# Patient Record
Sex: MALE
[De-identification: names, ages, dates, MRNs, and addresses within clinical notes are randomized per-mention and may not be internally consistent; named-entity substitution may affect disease eponyms.]

---

## 2023-09-18 ENCOUNTER — APPOINTMENT (OUTPATIENT)
Dept: INTERNAL MEDICINE | Facility: CLINIC | Age: 38
End: 2023-09-18
Payer: COMMERCIAL

## 2023-09-18 VITALS
OXYGEN SATURATION: 97 % | WEIGHT: 185 LBS | DIASTOLIC BLOOD PRESSURE: 80 MMHG | HEART RATE: 73 BPM | SYSTOLIC BLOOD PRESSURE: 110 MMHG | HEIGHT: 70 IN | BODY MASS INDEX: 26.48 KG/M2

## 2023-09-18 PROBLEM — Z00.00 ENCOUNTER FOR PREVENTIVE HEALTH EXAMINATION: Status: ACTIVE | Noted: 2023-09-18

## 2023-09-18 PROCEDURE — 99386 PREV VISIT NEW AGE 40-64: CPT | Mod: 25

## 2023-09-18 PROCEDURE — 93000 ELECTROCARDIOGRAM COMPLETE: CPT

## 2023-09-19 LAB
ALBUMIN SERPL ELPH-MCNC: 4.6 G/DL
ALP BLD-CCNC: 52 U/L
ALT SERPL-CCNC: 32 U/L
ANION GAP SERPL CALC-SCNC: 11 MMOL/L
AST SERPL-CCNC: 22 U/L
BILIRUB DIRECT SERPL-MCNC: 0.3 MG/DL
BILIRUB INDIRECT SERPL-MCNC: 1.2 MG/DL
BILIRUB SERPL-MCNC: 1.5 MG/DL
BUN SERPL-MCNC: 13 MG/DL
CALCIUM SERPL-MCNC: 9.4 MG/DL
CHLORIDE SERPL-SCNC: 102 MMOL/L
CHOLEST SERPL-MCNC: 205 MG/DL
CO2 SERPL-SCNC: 25 MMOL/L
CREAT SERPL-MCNC: 1.18 MG/DL
EGFR: 80 ML/MIN/1.73M2
ESTIMATED AVERAGE GLUCOSE: 103 MG/DL
GLUCOSE SERPL-MCNC: 83 MG/DL
HBA1C MFR BLD HPLC: 5.2 %
HCT VFR BLD CALC: 47.7 %
HDLC SERPL-MCNC: 30 MG/DL
HGB BLD-MCNC: 15.9 G/DL
LDLC SERPL CALC-MCNC: 160 MG/DL
MCHC RBC-ENTMCNC: 30.3 PG
MCHC RBC-ENTMCNC: 33.3 GM/DL
MCV RBC AUTO: 90.9 FL
NONHDLC SERPL-MCNC: 175 MG/DL
PLATELET # BLD AUTO: 160 K/UL
POTASSIUM SERPL-SCNC: 4.6 MMOL/L
PROT SERPL-MCNC: 7 G/DL
RBC # BLD: 5.25 M/UL
RBC # FLD: 12.2 %
SODIUM SERPL-SCNC: 138 MMOL/L
TRIGL SERPL-MCNC: 80 MG/DL
TSH SERPL-ACNC: 1.93 UIU/ML
WBC # FLD AUTO: 3.74 K/UL

## 2023-09-20 RX ORDER — HYDROXYZINE HYDROCHLORIDE 25 MG/1
25 TABLET ORAL
Qty: 30 | Refills: 0 | Status: ACTIVE | COMMUNITY
Start: 2023-09-20 | End: 1900-01-01

## 2023-09-21 LAB
TESTOST FREE SERPL-MCNC: 13.3 PG/ML
TESTOST SERPL-MCNC: 931 NG/DL

## 2023-09-25 LAB — ESTROGEN SERPL-MCNC: 191 PG/ML

## 2024-03-06 ENCOUNTER — TRANSCRIPTION ENCOUNTER (OUTPATIENT)
Age: 39
End: 2024-03-06

## 2024-03-08 ENCOUNTER — APPOINTMENT (OUTPATIENT)
Dept: INTERNAL MEDICINE | Facility: CLINIC | Age: 39
End: 2024-03-08
Payer: COMMERCIAL

## 2024-03-08 VITALS
OXYGEN SATURATION: 98 % | TEMPERATURE: 98.2 F | BODY MASS INDEX: 27.2 KG/M2 | SYSTOLIC BLOOD PRESSURE: 100 MMHG | DIASTOLIC BLOOD PRESSURE: 70 MMHG | WEIGHT: 190 LBS | HEIGHT: 70 IN | HEART RATE: 76 BPM

## 2024-03-08 DIAGNOSIS — E78.5 HYPERLIPIDEMIA, UNSPECIFIED: ICD-10-CM

## 2024-03-08 DIAGNOSIS — Z11.3 ENCOUNTER FOR SCREENING FOR INFECTIONS WITH A PREDOMINANTLY SEXUAL MODE OF TRANSMISSION: ICD-10-CM

## 2024-03-08 DIAGNOSIS — J31.0 CHRONIC RHINITIS: ICD-10-CM

## 2024-03-08 DIAGNOSIS — R53.83 OTHER FATIGUE: ICD-10-CM

## 2024-03-08 DIAGNOSIS — B35.9 DERMATOPHYTOSIS, UNSPECIFIED: ICD-10-CM

## 2024-03-08 PROCEDURE — 99214 OFFICE O/P EST MOD 30 MIN: CPT

## 2024-03-08 PROCEDURE — G2211 COMPLEX E/M VISIT ADD ON: CPT

## 2024-03-08 NOTE — REVIEW OF SYSTEMS
[Chills] : chills [Fever] : no fever [Fatigue] : fatigue [Discharge] : no discharge [Chest Pain] : no chest pain [Earache] : no earache [Nasal Discharge] : nasal discharge [Orthopena] : no orthopnea [Shortness Of Breath] : no shortness of breath [Diarrhea] : diarrhea [Abdominal Pain] : no abdominal pain [Dysuria] : no dysuria [Vomiting] : no vomiting [Joint Pain] : no joint pain [Back Pain] : no back pain [Hematuria] : no hematuria [Skin Rash] : no skin rash [Itching] : no itching [Headache] : no headache [Suicidal] : not suicidal [Memory Loss] : no memory loss [Easy Bleeding] : no easy bleeding

## 2024-03-08 NOTE — PLAN
[FreeTextEntry1] : Chronic diarrhea - refer to GI Rhinitis - start flonase Screening for STI - will obtain labs, pt is low risk Ringworm - start clotrimazole HLD - c/w lipitor 20, will obtain labs today HMT - UTD

## 2024-03-08 NOTE — PHYSICAL EXAM
[Normal Sclera/Conjunctiva] : normal sclera/conjunctiva [No Acute Distress] : no acute distress [No JVD] : no jugular venous distention [Normal Outer Ear/Nose] : the outer ears and nose were normal in appearance [No Accessory Muscle Use] : no accessory muscle use [No Respiratory Distress] : no respiratory distress  [Regular Rhythm] : with a regular rhythm [Clear to Auscultation] : lungs were clear to auscultation bilaterally [Normal Rate] : normal rate  [Normal S1, S2] : normal S1 and S2 [No Edema] : there was no peripheral edema [Soft] : abdomen soft [No CVA Tenderness] : no CVA  tenderness [No Joint Swelling] : no joint swelling

## 2024-03-08 NOTE — HISTORY OF PRESENT ILLNESS
[FreeTextEntry1] : follow-up [de-identified] : 38 year old male here for a f/u visit. Currently he complains of chronic diarrhea for over a month.  He has tried to modify his diet and it has not improved.  He denies any blood in stool, weight loss or abdominal pain.  He also complains of nasal congestion for 2 weeks.

## 2024-03-11 LAB
ALBUMIN SERPL ELPH-MCNC: 4.7 G/DL
ALP BLD-CCNC: 99 U/L
ALT SERPL-CCNC: 36 U/L
ANION GAP SERPL CALC-SCNC: 14 MMOL/L
APPEARANCE: CLEAR
AST SERPL-CCNC: 27 U/L
BACTERIA: NEGATIVE /HPF
BILIRUB DIRECT SERPL-MCNC: 0.4 MG/DL
BILIRUB INDIRECT SERPL-MCNC: 2.1 MG/DL
BILIRUB SERPL-MCNC: 2.5 MG/DL
BILIRUBIN URINE: NEGATIVE
BLOOD URINE: NEGATIVE
BUN SERPL-MCNC: 14 MG/DL
CALCIUM SERPL-MCNC: 9.6 MG/DL
CAST: 0 /LPF
CHLORIDE SERPL-SCNC: 100 MMOL/L
CHOLEST SERPL-MCNC: 192 MG/DL
CO2 SERPL-SCNC: 23 MMOL/L
COLOR: NORMAL
CREAT SERPL-MCNC: 1.32 MG/DL
EGFR: 70 ML/MIN/1.73M2
EPITHELIAL CELLS: 0 /HPF
ESTIMATED AVERAGE GLUCOSE: 94 MG/DL
FERRITIN SERPL-MCNC: 47 NG/ML
GLUCOSE QUALITATIVE U: NEGATIVE MG/DL
GLUCOSE SERPL-MCNC: 87 MG/DL
HBA1C MFR BLD HPLC: 4.9 %
HCT VFR BLD CALC: 49.6 %
HDLC SERPL-MCNC: 26 MG/DL
HGB BLD-MCNC: 16.1 G/DL
HIV1+2 AB SPEC QL IA.RAPID: NONREACTIVE
IRON SATN MFR SERPL: 26 %
IRON SERPL-MCNC: 114 UG/DL
KETONES URINE: NEGATIVE MG/DL
LDLC SERPL CALC-MCNC: 144 MG/DL
LEUKOCYTE ESTERASE URINE: NEGATIVE
MCHC RBC-ENTMCNC: 29.3 PG
MCHC RBC-ENTMCNC: 32.5 GM/DL
MCV RBC AUTO: 90.3 FL
MICROSCOPIC-UA: NORMAL
NITRITE URINE: NEGATIVE
NONHDLC SERPL-MCNC: 167 MG/DL
PH URINE: 5.5
PLATELET # BLD AUTO: 217 K/UL
POTASSIUM SERPL-SCNC: 4.6 MMOL/L
PROT SERPL-MCNC: 7.5 G/DL
PROTEIN URINE: NORMAL MG/DL
RBC # BLD: 5.49 M/UL
RBC # FLD: 13.1 %
RED BLOOD CELLS URINE: 2 /HPF
SODIUM SERPL-SCNC: 137 MMOL/L
SPECIFIC GRAVITY URINE: >1.03
T PALLIDUM AB SER QL IA: NEGATIVE
TIBC SERPL-MCNC: 448 UG/DL
TRIGL SERPL-MCNC: 123 MG/DL
TSH SERPL-ACNC: 2.4 UIU/ML
UIBC SERPL-MCNC: 333 UG/DL
UROBILINOGEN URINE: 0.2 MG/DL
VIT B12 SERPL-MCNC: 726 PG/ML
WBC # FLD AUTO: 4.84 K/UL
WHITE BLOOD CELLS URINE: 0 /HPF

## 2024-03-19 ENCOUNTER — APPOINTMENT (OUTPATIENT)
Dept: GASTROENTEROLOGY | Facility: CLINIC | Age: 39
End: 2024-03-19
Payer: COMMERCIAL

## 2024-03-19 VITALS
HEIGHT: 70 IN | RESPIRATION RATE: 14 BRPM | BODY MASS INDEX: 28.06 KG/M2 | WEIGHT: 196 LBS | HEART RATE: 79 BPM | TEMPERATURE: 98 F | SYSTOLIC BLOOD PRESSURE: 136 MMHG | OXYGEN SATURATION: 99 % | DIASTOLIC BLOOD PRESSURE: 80 MMHG

## 2024-03-19 DIAGNOSIS — K21.9 GASTRO-ESOPHAGEAL REFLUX DISEASE W/OUT ESOPHAGITIS: ICD-10-CM

## 2024-03-19 DIAGNOSIS — R15.2 FECAL URGENCY: ICD-10-CM

## 2024-03-19 DIAGNOSIS — Z86.39 PERSONAL HISTORY OF OTHER ENDOCRINE, NUTRITIONAL AND METABOLIC DISEASE: ICD-10-CM

## 2024-03-19 DIAGNOSIS — E80.4 GILBERT SYNDROME: ICD-10-CM

## 2024-03-19 DIAGNOSIS — Z78.9 OTHER SPECIFIED HEALTH STATUS: ICD-10-CM

## 2024-03-19 DIAGNOSIS — K52.9 NONINFECTIVE GASTROENTERITIS AND COLITIS, UNSPECIFIED: ICD-10-CM

## 2024-03-19 PROCEDURE — 99204 OFFICE O/P NEW MOD 45 MIN: CPT

## 2024-03-19 RX ORDER — SODIUM SULFATE, POTASSIUM SULFATE AND MAGNESIUM SULFATE 1.6; 3.13; 17.5 G/177ML; G/177ML; G/177ML
17.5-3.13-1.6 SOLUTION ORAL TWICE DAILY
Qty: 2 | Refills: 0 | Status: ACTIVE | COMMUNITY
Start: 2024-03-19 | End: 1900-01-01

## 2024-03-19 NOTE — REVIEW OF SYSTEMS
[Diarrhea] : diarrhea [Heartburn] : heartburn [Negative] : Heme/Lymph [FreeTextEntry7] : fecal urgency

## 2024-03-19 NOTE — ASSESSMENT
[FreeTextEntry1] : BIB PORTILLO was advised to undergo colonoscopy to which he agreed. The procedure will be performed in [facility] with the assistance of an anesthesiologist. The patient was given a Suprep preparation prescription and understood the  procedure as it was explained to his. He was given a booklet distributed by the [employer] Society of G[employer]  End[employer] explaining the procedure in detail and he understood the risks of the procedure not limited to infection, bleeding, perforation or non- diagnosis of colorectal cancer. He was advised that he could not drive home, if he chooses to  receive sedation.  Further diagnostic and treatment recommendations will be based upon the procedure and any biopsies, if they are taken.  Thank you for allowing me to participate in this patients health care.  , Best personal regards -- Douglas PORTILLO was advised to undergo endoscopy to which he agreed. The procedure will be performed in [facility] with the assistance of an anesthesiologist. He was given a booklet distributed by the American Society of Gastrointestinal Endoscopy explaining the procedure in detail and he understood the risks of the procedure not limited to infection, bleeding, perforation or non- diagnosis of gastric or esophageal cancer.  He was advised that he could not drive home, if he chooses to receive sedation. Further diagnostic and treatment recommendations will be based upon the procedure and any biopsies, if they are taken. Thank you for allowing me to participate in this patients health care.   I spent 47 minutes with the patient and his wife.

## 2024-03-19 NOTE — HISTORY OF PRESENT ILLNESS
\"Final forms have been reviewed/signed by the provider and mailed to the patients home address as there is no authorization form for these forms. Patient notified on 10/27/22.\"   [FreeTextEntry1] : He is a 38-year-old male with a 2-month history of fecal urgency , diarrhea and heartburn.  He denies rectal bleeding or weight loss.  He has a family history of ulcerative colitis specifically his father.  He presently has heartburn on a daily basis and takes Tums daily.  He had an endoscopy and a colonoscopy 13 years ago which were negative.  He denies NSAID use.  He denies difficulty swallowing food.  His routine laboratory data revealed  an elevated total bilirubin with the majority of it being indirect with normal AST ALT and alkaline phosphatase consistent with Gilbert syndrome.   His wife was in the room

## 2024-03-19 NOTE — CONSULT LETTER
[Dear  ___] : Dear  [unfilled], [Consult Letter:] : I had the pleasure of evaluating your patient, [unfilled]. [( Thank you for referring [unfilled] for consultation for _____ )] : Thank you for referring [unfilled] for consultation for [unfilled] [Please see my note below.] : Please see my note below. [Sincerely,] : Sincerely, [Consult Closing:] : Thank you very much for allowing me to participate in the care of this patient.  If you have any questions, please do not hesitate to contact me. [FreeTextEntry3] : Douglas Mitchell MD  Gastroenterology [facility]

## 2024-03-22 RX ORDER — POLYETHYLENE GLYCOL 3350 AND ELECTROLYTES WITH LEMON FLAVOR 236; 22.74; 6.74; 5.86; 2.97 G/4L; G/4L; G/4L; G/4L; G/4L
236 POWDER, FOR SOLUTION ORAL
Qty: 1 | Refills: 0 | Status: ACTIVE | COMMUNITY
Start: 2024-03-22 | End: 1900-01-01

## 2024-04-01 ENCOUNTER — APPOINTMENT (OUTPATIENT)
Dept: GASTROENTEROLOGY | Facility: AMBULATORY SURGERY CENTER | Age: 39
End: 2024-04-01
Payer: COMMERCIAL

## 2024-04-01 ENCOUNTER — RESULT REVIEW (OUTPATIENT)
Age: 39
End: 2024-04-01

## 2024-04-01 DIAGNOSIS — K25.3 ACUTE GASTRIC ULCER W/OUT HEMORRHAGE OR PERFORATION: ICD-10-CM

## 2024-04-01 PROCEDURE — 45380 COLONOSCOPY AND BIOPSY: CPT

## 2024-04-01 PROCEDURE — 43239 EGD BIOPSY SINGLE/MULTIPLE: CPT

## 2024-04-01 RX ORDER — PANTOPRAZOLE 40 MG/1
40 TABLET, DELAYED RELEASE ORAL
Qty: 30 | Refills: 4 | Status: ACTIVE | COMMUNITY
Start: 2024-04-01 | End: 1900-01-01

## 2024-04-01 RX ORDER — DICYCLOMINE HYDROCHLORIDE 20 MG/1
20 TABLET ORAL
Qty: 90 | Refills: 3 | Status: ACTIVE | COMMUNITY
Start: 2024-04-01 | End: 1900-01-01

## 2024-04-04 LAB
C TRACH RRNA SPEC QL NAA+PROBE: NOT DETECTED
N GONORRHOEA RRNA SPEC QL NAA+PROBE: NOT DETECTED
N GONORRHOEA RRNA SPEC QL NAA+PROBE: NOT DETECTED
SOURCE AMPLIFICATION: NORMAL
SOURCE AMPLIFICATION: NORMAL

## 2024-04-04 RX ORDER — FLUTICASONE PROPIONATE 50 UG/1
50 SPRAY, METERED NASAL DAILY
Qty: 1 | Refills: 1 | Status: ACTIVE | COMMUNITY
Start: 2024-03-08 | End: 1900-01-01

## 2024-04-04 RX ORDER — CLOTRIMAZOLE 10 MG/G
1 CREAM TOPICAL TWICE DAILY
Qty: 1 | Refills: 1 | Status: ACTIVE | COMMUNITY
Start: 2024-03-08 | End: 1900-01-01

## 2024-05-10 RX ORDER — ATORVASTATIN CALCIUM 20 MG/1
20 TABLET, FILM COATED ORAL
Qty: 30 | Refills: 2 | Status: ACTIVE | COMMUNITY
Start: 2023-09-19 | End: 1900-01-01

## 2024-05-11 ENCOUNTER — RX RENEWAL (OUTPATIENT)
Age: 39
End: 2024-05-11

## 2024-07-29 ENCOUNTER — APPOINTMENT (OUTPATIENT)
Dept: INTERNAL MEDICINE | Facility: CLINIC | Age: 39
End: 2024-07-29
Payer: COMMERCIAL

## 2024-07-29 VITALS
BODY MASS INDEX: 27.63 KG/M2 | SYSTOLIC BLOOD PRESSURE: 110 MMHG | HEIGHT: 70 IN | TEMPERATURE: 98.4 F | OXYGEN SATURATION: 99 % | DIASTOLIC BLOOD PRESSURE: 80 MMHG | WEIGHT: 193 LBS | HEART RATE: 60 BPM

## 2024-07-29 DIAGNOSIS — Z13.6 ENCOUNTER FOR SCREENING FOR CARDIOVASCULAR DISORDERS: ICD-10-CM

## 2024-07-29 DIAGNOSIS — E78.5 HYPERLIPIDEMIA, UNSPECIFIED: ICD-10-CM

## 2024-07-29 DIAGNOSIS — R53.83 OTHER FATIGUE: ICD-10-CM

## 2024-07-29 DIAGNOSIS — K21.9 GASTRO-ESOPHAGEAL REFLUX DISEASE W/OUT ESOPHAGITIS: ICD-10-CM

## 2024-07-29 PROCEDURE — G2211 COMPLEX E/M VISIT ADD ON: CPT | Mod: NC

## 2024-07-29 PROCEDURE — 99214 OFFICE O/P EST MOD 30 MIN: CPT

## 2024-07-30 LAB
ALBUMIN SERPL ELPH-MCNC: 4.8 G/DL
ALP BLD-CCNC: 83 U/L
ALT SERPL-CCNC: 85 U/L
ANION GAP SERPL CALC-SCNC: 12 MMOL/L
APPEARANCE: CLEAR
AST SERPL-CCNC: 45 U/L
BACTERIA: NEGATIVE /HPF
BASOPHILS # BLD AUTO: 0.05 K/UL
BASOPHILS NFR BLD AUTO: 1.1 %
BILIRUB DIRECT SERPL-MCNC: 0.3 MG/DL
BILIRUB INDIRECT SERPL-MCNC: 2 MG/DL
BILIRUB SERPL-MCNC: 2.3 MG/DL
BILIRUBIN URINE: NEGATIVE
BLOOD URINE: NEGATIVE
BUN SERPL-MCNC: 16 MG/DL
CALCIUM SERPL-MCNC: 10 MG/DL
CAST: 0 /LPF
CHLORIDE SERPL-SCNC: 100 MMOL/L
CHOLEST SERPL-MCNC: 237 MG/DL
CO2 SERPL-SCNC: 26 MMOL/L
COLOR: YELLOW
CREAT SERPL-MCNC: 1.18 MG/DL
EGFR: 80 ML/MIN/1.73M2
EOSINOPHIL # BLD AUTO: 0.1 K/UL
EOSINOPHIL NFR BLD AUTO: 2.1 %
EPITHELIAL CELLS: 0 /HPF
ESTIMATED AVERAGE GLUCOSE: 91 MG/DL
ESTRADIOL SERPL-MCNC: 38 PG/ML
GLUCOSE QUALITATIVE U: NEGATIVE MG/DL
GLUCOSE SERPL-MCNC: 89 MG/DL
HBA1C MFR BLD HPLC: 4.8 %
HCT VFR BLD CALC: 45.5 %
HDLC SERPL-MCNC: 32 MG/DL
HGB BLD-MCNC: 14.5 G/DL
IMM GRANULOCYTES NFR BLD AUTO: 0.2 %
KETONES URINE: NEGATIVE MG/DL
LDLC SERPL CALC-MCNC: 190 MG/DL
LEUKOCYTE ESTERASE URINE: NEGATIVE
LYMPHOCYTES # BLD AUTO: 1.24 K/UL
LYMPHOCYTES NFR BLD AUTO: 26.3 %
MAN DIFF?: NORMAL
MCHC RBC-ENTMCNC: 28.3 PG
MCHC RBC-ENTMCNC: 31.9 GM/DL
MCV RBC AUTO: 88.9 FL
MICROSCOPIC-UA: NORMAL
MONOCYTES # BLD AUTO: 0.49 K/UL
MONOCYTES NFR BLD AUTO: 10.4 %
NEUTROPHILS # BLD AUTO: 2.83 K/UL
NEUTROPHILS NFR BLD AUTO: 59.9 %
NITRITE URINE: NEGATIVE
NONHDLC SERPL-MCNC: 204 MG/DL
PH URINE: 7
PLATELET # BLD AUTO: 202 K/UL
POTASSIUM SERPL-SCNC: 4.5 MMOL/L
PROLACTIN SERPL-MCNC: 14.5 NG/ML
PROT SERPL-MCNC: 7.4 G/DL
PROTEIN URINE: NEGATIVE MG/DL
RBC # BLD: 5.12 M/UL
RBC # FLD: 14.3 %
RED BLOOD CELLS URINE: 0 /HPF
SHBG SERPL-SCNC: 8.6 NMOL/L
SODIUM SERPL-SCNC: 138 MMOL/L
SPECIFIC GRAVITY URINE: 1.01
TRIGL SERPL-MCNC: 81 MG/DL
TSH SERPL-ACNC: 1.79 UIU/ML
UROBILINOGEN URINE: 0.2 MG/DL
WBC # FLD AUTO: 4.72 K/UL
WHITE BLOOD CELLS URINE: 0 /HPF

## 2024-07-30 NOTE — PLAN
[FreeTextEntry1] : HLD - c/w statin, check labs, refer for CT heart calcium score Chronic diarrhea - f/u w/ GI HMT - UTD

## 2024-07-30 NOTE — REVIEW OF SYSTEMS
[Constipation] : constipation [Diarrhea] : diarrhea [Fever] : no fever [Night Sweats] : no night sweats [Discharge] : no discharge [Vision Problems] : no vision problems [Earache] : no earache [Nasal Discharge] : no nasal discharge [Chest Pain] : no chest pain [Orthopena] : no orthopnea [Shortness Of Breath] : no shortness of breath [Abdominal Pain] : no abdominal pain [Vomiting] : no vomiting [Dysuria] : no dysuria [Hematuria] : no hematuria [Joint Pain] : no joint pain [Back Pain] : no back pain [Itching] : no itching [Skin Rash] : no skin rash

## 2024-07-30 NOTE — HISTORY OF PRESENT ILLNESS
[FreeTextEntry1] : follow-up, HLD [de-identified] : 39 year old male here for a f/u visit. Currently he has no acute medical complaints.

## 2024-07-31 LAB
TESTOST FREE SERPL-MCNC: 12.7 PG/ML
TESTOST SERPL-MCNC: 463 NG/DL

## 2024-10-08 ENCOUNTER — INPATIENT (INPATIENT)
Facility: HOSPITAL | Age: 39
LOS: 2 days | Discharge: HOME | DRG: 558 | End: 2024-10-11
Attending: INTERNAL MEDICINE | Admitting: INTERNAL MEDICINE
Payer: COMMERCIAL

## 2024-10-08 VITALS
WEIGHT: 186.07 LBS | HEART RATE: 86 BPM | HEIGHT: 70 IN | SYSTOLIC BLOOD PRESSURE: 120 MMHG | OXYGEN SATURATION: 98 % | DIASTOLIC BLOOD PRESSURE: 81 MMHG | RESPIRATION RATE: 18 BRPM | TEMPERATURE: 98 F

## 2024-10-08 DIAGNOSIS — Z29.9 ENCOUNTER FOR PROPHYLACTIC MEASURES, UNSPECIFIED: ICD-10-CM

## 2024-10-08 DIAGNOSIS — R74.01 ELEVATION OF LEVELS OF LIVER TRANSAMINASE LEVELS: ICD-10-CM

## 2024-10-08 DIAGNOSIS — R17 UNSPECIFIED JAUNDICE: ICD-10-CM

## 2024-10-08 LAB
ALBUMIN SERPL ELPH-MCNC: 4.2 G/DL — SIGNIFICANT CHANGE UP (ref 3.3–5)
ALP SERPL-CCNC: 67 U/L — SIGNIFICANT CHANGE UP (ref 40–120)
ALT FLD-CCNC: 196 U/L — HIGH (ref 10–45)
ANION GAP SERPL CALC-SCNC: 10 MMOL/L — SIGNIFICANT CHANGE UP (ref 5–17)
APPEARANCE UR: CLEAR — SIGNIFICANT CHANGE UP
AST SERPL-CCNC: 773 U/L — HIGH (ref 10–40)
BACTERIA # UR AUTO: NEGATIVE /HPF — SIGNIFICANT CHANGE UP
BASOPHILS # BLD AUTO: 0.03 K/UL — SIGNIFICANT CHANGE UP (ref 0–0.2)
BASOPHILS NFR BLD AUTO: 0.4 % — SIGNIFICANT CHANGE UP (ref 0–2)
BILIRUB SERPL-MCNC: 2.3 MG/DL — HIGH (ref 0.2–1.2)
BILIRUB UR-MCNC: NEGATIVE — SIGNIFICANT CHANGE UP
BUN SERPL-MCNC: 15 MG/DL — SIGNIFICANT CHANGE UP (ref 7–23)
CALCIUM SERPL-MCNC: 9 MG/DL — SIGNIFICANT CHANGE UP (ref 8.4–10.5)
CAST: 2 /LPF — SIGNIFICANT CHANGE UP (ref 0–4)
CHLORIDE SERPL-SCNC: 102 MMOL/L — SIGNIFICANT CHANGE UP (ref 96–108)
CK SERPL-CCNC: CRITICAL HIGH U/L (ref 30–200)
CO2 SERPL-SCNC: 23 MMOL/L — SIGNIFICANT CHANGE UP (ref 22–31)
COLOR SPEC: ABNORMAL
CREAT SERPL-MCNC: 1.21 MG/DL — SIGNIFICANT CHANGE UP (ref 0.5–1.3)
DIFF PNL FLD: ABNORMAL
EGFR: 77 ML/MIN/1.73M2 — SIGNIFICANT CHANGE UP
EOSINOPHIL # BLD AUTO: 0.14 K/UL — SIGNIFICANT CHANGE UP (ref 0–0.5)
EOSINOPHIL NFR BLD AUTO: 1.9 % — SIGNIFICANT CHANGE UP (ref 0–6)
GLUCOSE SERPL-MCNC: 109 MG/DL — HIGH (ref 70–99)
GLUCOSE UR QL: NEGATIVE MG/DL — SIGNIFICANT CHANGE UP
HCT VFR BLD CALC: 54.8 % — HIGH (ref 39–50)
HGB BLD-MCNC: 17.4 G/DL — HIGH (ref 13–17)
IMM GRANULOCYTES NFR BLD AUTO: 0.4 % — SIGNIFICANT CHANGE UP (ref 0–0.9)
KETONES UR-MCNC: NEGATIVE MG/DL — SIGNIFICANT CHANGE UP
LEUKOCYTE ESTERASE UR-ACNC: ABNORMAL
LYMPHOCYTES # BLD AUTO: 0.8 K/UL — LOW (ref 1–3.3)
LYMPHOCYTES # BLD AUTO: 10.8 % — LOW (ref 13–44)
MAGNESIUM SERPL-MCNC: 2.6 MG/DL — SIGNIFICANT CHANGE UP (ref 1.6–2.6)
MCHC RBC-ENTMCNC: 27.6 PG — SIGNIFICANT CHANGE UP (ref 27–34)
MCHC RBC-ENTMCNC: 31.8 GM/DL — LOW (ref 32–36)
MCV RBC AUTO: 86.8 FL — SIGNIFICANT CHANGE UP (ref 80–100)
MONOCYTES # BLD AUTO: 0.45 K/UL — SIGNIFICANT CHANGE UP (ref 0–0.9)
MONOCYTES NFR BLD AUTO: 6.1 % — SIGNIFICANT CHANGE UP (ref 2–14)
NEUTROPHILS # BLD AUTO: 5.93 K/UL — SIGNIFICANT CHANGE UP (ref 1.8–7.4)
NEUTROPHILS NFR BLD AUTO: 80.4 % — HIGH (ref 43–77)
NITRITE UR-MCNC: NEGATIVE — SIGNIFICANT CHANGE UP
NRBC # BLD: 0 /100 WBCS — SIGNIFICANT CHANGE UP (ref 0–0)
PH UR: 5.5 — SIGNIFICANT CHANGE UP (ref 5–8)
PLATELET # BLD AUTO: 170 K/UL — SIGNIFICANT CHANGE UP (ref 150–400)
POTASSIUM SERPL-MCNC: 4.5 MMOL/L — SIGNIFICANT CHANGE UP (ref 3.5–5.3)
POTASSIUM SERPL-SCNC: 4.5 MMOL/L — SIGNIFICANT CHANGE UP (ref 3.5–5.3)
PROT SERPL-MCNC: 7.2 G/DL — SIGNIFICANT CHANGE UP (ref 6–8.3)
PROT UR-MCNC: 300 MG/DL
RBC # BLD: 6.31 M/UL — HIGH (ref 4.2–5.8)
RBC # FLD: 13.7 % — SIGNIFICANT CHANGE UP (ref 10.3–14.5)
RBC CASTS # UR COMP ASSIST: 0 /HPF — SIGNIFICANT CHANGE UP (ref 0–4)
REVIEW: SIGNIFICANT CHANGE UP
SODIUM SERPL-SCNC: 135 MMOL/L — SIGNIFICANT CHANGE UP (ref 135–145)
SP GR SPEC: 1.02 — SIGNIFICANT CHANGE UP (ref 1–1.03)
SQUAMOUS # UR AUTO: 2 /HPF — SIGNIFICANT CHANGE UP (ref 0–5)
UROBILINOGEN FLD QL: 0.2 MG/DL — SIGNIFICANT CHANGE UP (ref 0.2–1)
WBC # BLD: 7.38 K/UL — SIGNIFICANT CHANGE UP (ref 3.8–10.5)
WBC # FLD AUTO: 7.38 K/UL — SIGNIFICANT CHANGE UP (ref 3.8–10.5)
WBC UR QL: 0 /HPF — SIGNIFICANT CHANGE UP (ref 0–5)

## 2024-10-08 PROCEDURE — 99223 1ST HOSP IP/OBS HIGH 75: CPT

## 2024-10-08 PROCEDURE — 99285 EMERGENCY DEPT VISIT HI MDM: CPT

## 2024-10-08 RX ORDER — MAG HYDROX/ALUMINUM HYD/SIMETH 200-200-20
30 SUSPENSION, ORAL (FINAL DOSE FORM) ORAL EVERY 4 HOURS
Refills: 0 | Status: DISCONTINUED | OUTPATIENT
Start: 2024-10-08 | End: 2024-10-11

## 2024-10-08 RX ORDER — SODIUM CHLORIDE IRRIG SOLUTION 0.9 %
1000 SOLUTION, IRRIGATION IRRIGATION
Refills: 0 | Status: DISCONTINUED | OUTPATIENT
Start: 2024-10-08 | End: 2024-10-08

## 2024-10-08 RX ORDER — ACETAMINOPHEN 325 MG
1000 TABLET ORAL ONCE
Refills: 0 | Status: COMPLETED | OUTPATIENT
Start: 2024-10-08 | End: 2024-10-08

## 2024-10-08 RX ORDER — 5-HYDROXYTRYPTOPHAN (5-HTP) 100 MG
3 TABLET,DISINTEGRATING ORAL AT BEDTIME
Refills: 0 | Status: DISCONTINUED | OUTPATIENT
Start: 2024-10-08 | End: 2024-10-11

## 2024-10-08 RX ORDER — ACETAMINOPHEN 325 MG
650 TABLET ORAL EVERY 6 HOURS
Refills: 0 | Status: DISCONTINUED | OUTPATIENT
Start: 2024-10-08 | End: 2024-10-11

## 2024-10-08 RX ORDER — SODIUM CHLORIDE IRRIG SOLUTION 0.9 %
1000 SOLUTION, IRRIGATION IRRIGATION ONCE
Refills: 0 | Status: COMPLETED | OUTPATIENT
Start: 2024-10-08 | End: 2024-10-08

## 2024-10-08 RX ORDER — INFLUENZA VIRUS VACCINE 15; 15; 15; 15 UG/.5ML; UG/.5ML; UG/.5ML; UG/.5ML
0.5 SUSPENSION INTRAMUSCULAR ONCE
Refills: 0 | Status: DISCONTINUED | OUTPATIENT
Start: 2024-10-08 | End: 2024-10-11

## 2024-10-08 RX ORDER — SODIUM CHLORIDE IRRIG SOLUTION 0.9 %
1000 SOLUTION, IRRIGATION IRRIGATION
Refills: 0 | Status: COMPLETED | OUTPATIENT
Start: 2024-10-08 | End: 2024-10-09

## 2024-10-08 RX ORDER — ONDANSETRON HCL/PF 4 MG/2 ML
4 VIAL (ML) INJECTION EVERY 8 HOURS
Refills: 0 | Status: DISCONTINUED | OUTPATIENT
Start: 2024-10-08 | End: 2024-10-11

## 2024-10-08 RX ADMIN — Medication 1000 MILLIGRAM(S): at 11:00

## 2024-10-08 RX ADMIN — Medication 1000 MILLILITER(S): at 07:15

## 2024-10-08 RX ADMIN — Medication 200 MILLILITER(S): at 12:57

## 2024-10-08 RX ADMIN — Medication 200 MILLILITER(S): at 20:51

## 2024-10-08 RX ADMIN — Medication 1000 MILLILITER(S): at 09:20

## 2024-10-08 RX ADMIN — Medication 200 MILLILITER(S): at 11:01

## 2024-10-08 RX ADMIN — Medication 400 MILLIGRAM(S): at 07:51

## 2024-10-08 RX ADMIN — Medication 650 MILLIGRAM(S): at 20:51

## 2024-10-08 RX ADMIN — Medication 650 MILLIGRAM(S): at 12:55

## 2024-10-08 RX ADMIN — Medication 650 MILLIGRAM(S): at 21:21

## 2024-10-08 NOTE — ED PROVIDER NOTE - PROGRESS NOTE DETAILS
Sign out received on patient. Patient re-assessed by me at bedside. Patent endorsing some soreness/pain over triceps. State arms feel sore and difficult to lift to face. Ordered IV Ofirmev. Urine collection pending. Patient currently receiving IV hydration.  Pending labs results.   MD Anna Total Bilirubin and ALT elevations noted. 2nd Liter of Fluids ordered. Pending remainder of labs and urine. Total Bilirubin and ALT elevations noted. 2nd Liter of Fluids ordered. Pending remainder of labs and urine. CK 85,792. Large amounts of Blood in he Urine. Admitting to medicine

## 2024-10-08 NOTE — H&P ADULT - NSHPPHYSICALEXAM_GEN_ALL_CORE
Vital Signs Last 24 Hrs  T(C): 36.6 (08 Oct 2024 06:24), Max: 36.6 (08 Oct 2024 06:24)  T(F): 97.9 (08 Oct 2024 06:24), Max: 97.9 (08 Oct 2024 06:24)  HR: 75 (08 Oct 2024 11:30) (75 - 86)  BP: 129/68 (08 Oct 2024 11:30) (117/72 - 129/68)  BP(mean): --  RR: 20 (08 Oct 2024 11:30) (18 - 20)  SpO2: 100% (08 Oct 2024 11:30) (98% - 100%)    Parameters below as of 08 Oct 2024 11:30  Patient On (Oxygen Delivery Method): room air    PHYSICAL EXAM:  GENERAL: NAD, well-developed  HEAD:  Atraumatic, normocephalic  EYES: EOMI, conjunctiva and sclera clear  NECK: Supple, no JVD  CHEST/LUNG: Clear to auscultation bilaterally; no wheezing or rales  HEART: Regular rate and rhythm; no murmurs, no LE edema   ABDOMEN: Soft, nontender, nondistended; bowel sounds present  EXTREMITIES:  2+ Peripheral Pulses, no clubbing, cyanosis  PSYCH: AAOx3, calm affect, not anxious  NEUROLOGY: non-focal, moving all extremities equally, sensation grossly intact   SKIN: No rashes or lesions  MUSCULOSKELETAL: no back pain, moving all extremities

## 2024-10-08 NOTE — H&P ADULT - PROBLEM SELECTOR PLAN 1
pt with rhabdomyolysis in setting of intense work-out with CK ~86K and c/o upper body pain and swelling   - c/w IVF at 200cc hr for now   - pain control with tylenol   - repeat CK in AM   - Cr at baseline of 1.2 - c/t monitor in setting of rhabdo

## 2024-10-08 NOTE — H&P ADULT - NSHPLABSRESULTS_GEN_ALL_CORE
17.4   7.38  )-----------( 170      ( 08 Oct 2024 07:31 )             54.8     10    135  |  102  |  15  ----------------------------<  109[H]  4.5   |  23  |  1.21    Ca    9.0      08 Oct 2024 07:31  Mg     2.6     10    TPro  7.2  /  Alb  4.2  /  TBili  2.3[H]  /  DBili  x   /  AST  773[H]  /  ALT  196[H]  /  AlkPhos  67  10            Urinalysis Basic - ( 08 Oct 2024 09:29 )    Color: Orange / Appearance: Clear / S.017 / pH: x  Gluc: x / Ketone: Negative mg/dL  / Bili: Negative / Urobili: 0.2 mg/dL   Blood: x / Protein: 300 mg/dL / Nitrite: Negative   Leuk Esterase: Small / RBC: 0 /HPF / WBC 0 /HPF   Sq Epi: x / Non Sq Epi: 2 /HPF / Bacteria: Negative /HPF

## 2024-10-08 NOTE — ED ADULT NURSE REASSESSMENT NOTE - NS ED NURSE REASSESS COMMENT FT1
Received awake alert and orientedx4 Resp even and nonlab LR admin C/o Pain MED aware. Labs sent. SO at bedside.

## 2024-10-08 NOTE — PATIENT PROFILE ADULT - FALL HARM RISK - HARM RISK INTERVENTIONS

## 2024-10-08 NOTE — H&P ADULT - PROBLEM SELECTOR PLAN 4
VTE ppx - low risk, encourage OOB   Diet - regular   Dispo - pending clinical improvement     Med management:  Holding statin inpt - can resume as outpt   resume testosterone after discharge

## 2024-10-08 NOTE — ED ADULT NURSE NOTE - OBJECTIVE STATEMENT
pt is a 40yo male presenting to the ED complaining of dark urine. pt reports onset of b/l arm pain, dark colored urine and penile pain upon waking up this AM, reports working out at gym yesterday and also exerting self at work by lifting heavy boxes, coming to ED for evaluation. pt also reports statin and testosterone use.pt A&Ox3 gross neuro intact, no difficulty speaking in complete sentences, pulses x 4, dick x4, abdomen soft nontender nondistended, skin intact. pt denies chest pain, sob, ha, n/v/d, abdominal pain, f/c

## 2024-10-08 NOTE — H&P ADULT - HISTORY OF PRESENT ILLNESS
39M with PMH of HLD, GERD, [place]' syndrome p/w muscle soreness and dark urine. Pt states he has vrial gastroenteritis symptoms over the weekend with abd pain, nausea and diarrhea, which self resolved after 36 hours. On day PTA, pt went to the gym and worked out with more intensity than recent weeks, specifically his upper body. Later that day at work, he was helping to lift heavy boxes as well. That night he felt soreness in b/l UE and chest wall, and took some tylenol. Overnight and into this morning, the pain became more intense and he noted swelling of b/l UEs. He noted dark urine this AM which prompted him to come to ED for further evaluation.   Endorsing non radiating MSK pain in b/l UEs and b/l lateral chest walls, worse with movement, +swollen, no radiation, no SOB.  Denies any HA, blurry vision, no abd pain, no n/v, +dark urine, no dysuria or blood, last BM yest, normal, no blood, no diarrhea, no LE pain or swelling. Has never had this happen in the past.

## 2024-10-08 NOTE — ED PROVIDER NOTE - PHYSICAL EXAMINATION
Alert awake and oriented, comfortable well-appearing  Mild tenderness to palpation over the bilateral triceps areas no skin changes distal neurovascular exam is reassuring clear lungs S1-S2 soft nontender abdomen no organomegaly no other skin changes noticed the compartments of the lower extremities examined and are soft

## 2024-10-08 NOTE — ED PROVIDER NOTE - CLINICAL SUMMARY MEDICAL DECISION MAKING FREE TEXT BOX
Concern for for rhabdomyolysis   from exertion which could have been exacerbated by testosterone or statins we will do labs UA IV hydration and reassess low concern for compartment syndrome given patient has intact neurologic exam and pulses are normal Concern for for rhabdomyolysis   from exertion which could have been exacerbated by testosterone or statins we will do labs UA IV hydration and reassess low concern for compartment syndrome given patient has intact neurologic exam and pulses are normal    CK 85,792. ALT and AST elevated. Urine + for large amounts of blood.  Admitting to medicine for IV hydration

## 2024-10-08 NOTE — ED PROVIDER NOTE - OBJECTIVE STATEMENT
39-year-old gentleman with history of hyperlipidemia on atorvastatin 40 mg as well as injections of testosterone weekly and 150 units presenting with upper back and triceps bilateral pain and discomfort that was precipitated by working out more heavily than usual as well as moving heavy objects at work right after patient experienced dark urine today and was worried that this might have been due to with his heavy lifting and decided to present denies chest pain shortness of breath abdominal pain nausea vomiting rectal bleeding denies urinary frequency but has discomfort with urination denies any new medications

## 2024-10-08 NOTE — H&P ADULT - NSICDXPASTMEDICALHX_GEN_ALL_CORE_FT
PAST MEDICAL HISTORY:  GERD (gastroesophageal reflux disease)     Gilbert syndrome     HLD (hyperlipidemia)

## 2024-10-08 NOTE — H&P ADULT - ASSESSMENT
39M with PMH of HLD, GERD, [place]' syndrome p/w muscle soreness and dark urine, a/w rhabdomyolysis.

## 2024-10-09 LAB
ALBUMIN SERPL ELPH-MCNC: 3.5 G/DL — SIGNIFICANT CHANGE UP (ref 3.3–5)
ALP SERPL-CCNC: 60 U/L — SIGNIFICANT CHANGE UP (ref 40–120)
ALT FLD-CCNC: 357 U/L — HIGH (ref 10–45)
ANION GAP SERPL CALC-SCNC: 10 MMOL/L — SIGNIFICANT CHANGE UP (ref 5–17)
AST SERPL-CCNC: 1234 U/L — HIGH (ref 10–40)
BILIRUB SERPL-MCNC: 1.2 MG/DL — SIGNIFICANT CHANGE UP (ref 0.2–1.2)
BUN SERPL-MCNC: 13 MG/DL — SIGNIFICANT CHANGE UP (ref 7–23)
CALCIUM SERPL-MCNC: 9 MG/DL — SIGNIFICANT CHANGE UP (ref 8.4–10.5)
CHLORIDE SERPL-SCNC: 105 MMOL/L — SIGNIFICANT CHANGE UP (ref 96–108)
CK SERPL-CCNC: CRITICAL HIGH U/L (ref 30–200)
CO2 SERPL-SCNC: 23 MMOL/L — SIGNIFICANT CHANGE UP (ref 22–31)
CREAT SERPL-MCNC: 0.96 MG/DL — SIGNIFICANT CHANGE UP (ref 0.5–1.3)
EGFR: 102 ML/MIN/1.73M2 — SIGNIFICANT CHANGE UP
GLUCOSE SERPL-MCNC: 119 MG/DL — HIGH (ref 70–99)
HCT VFR BLD CALC: 46.8 % — SIGNIFICANT CHANGE UP (ref 39–50)
HGB BLD-MCNC: 15.2 G/DL — SIGNIFICANT CHANGE UP (ref 13–17)
MAGNESIUM SERPL-MCNC: 2.2 MG/DL — SIGNIFICANT CHANGE UP (ref 1.6–2.6)
MCHC RBC-ENTMCNC: 28.1 PG — SIGNIFICANT CHANGE UP (ref 27–34)
MCHC RBC-ENTMCNC: 32.5 GM/DL — SIGNIFICANT CHANGE UP (ref 32–36)
MCV RBC AUTO: 86.5 FL — SIGNIFICANT CHANGE UP (ref 80–100)
NRBC # BLD: 0 /100 WBCS — SIGNIFICANT CHANGE UP (ref 0–0)
PLATELET # BLD AUTO: 150 K/UL — SIGNIFICANT CHANGE UP (ref 150–400)
POTASSIUM SERPL-MCNC: 3.9 MMOL/L — SIGNIFICANT CHANGE UP (ref 3.5–5.3)
POTASSIUM SERPL-SCNC: 3.9 MMOL/L — SIGNIFICANT CHANGE UP (ref 3.5–5.3)
PROT SERPL-MCNC: 5.9 G/DL — LOW (ref 6–8.3)
RBC # BLD: 5.41 M/UL — SIGNIFICANT CHANGE UP (ref 4.2–5.8)
RBC # FLD: 13.5 % — SIGNIFICANT CHANGE UP (ref 10.3–14.5)
SODIUM SERPL-SCNC: 138 MMOL/L — SIGNIFICANT CHANGE UP (ref 135–145)
WBC # BLD: 4.56 K/UL — SIGNIFICANT CHANGE UP (ref 3.8–10.5)
WBC # FLD AUTO: 4.56 K/UL — SIGNIFICANT CHANGE UP (ref 3.8–10.5)

## 2024-10-09 PROCEDURE — 99232 SBSQ HOSP IP/OBS MODERATE 35: CPT | Mod: GC

## 2024-10-09 PROCEDURE — 76705 ECHO EXAM OF ABDOMEN: CPT | Mod: 26

## 2024-10-09 RX ADMIN — Medication 650 MILLIGRAM(S): at 07:02

## 2024-10-09 RX ADMIN — Medication 200 MILLILITER(S): at 09:27

## 2024-10-09 RX ADMIN — Medication 650 MILLIGRAM(S): at 06:32

## 2024-10-09 RX ADMIN — Medication 200 MILLILITER(S): at 16:37

## 2024-10-09 NOTE — PROGRESS NOTE ADULT - SUBJECTIVE AND OBJECTIVE BOX
Patient is a 39y old  Male who presents with a chief complaint of muscle soreness, dark urine (09 Oct 2024 07:39)      ======Overnight/Subjective======  Overnight there were no events. Patient endorses a decrease in muscle soreness. Denies fevers, chills, night sweats, SOB, cp, nausea or vomiting. He does endorse taking testosterone that he gets at his gym and for which he orders his own labs. Discussed at length the need for transition of care to an endocrinologist.    Brief daily plan: Trend CK    ======Medications======  MEDICATIONS  (STANDING):  influenza   Vaccine 0.5 milliLiter(s) IntraMuscular once    MEDICATIONS  (PRN):  acetaminophen     Tablet .. 650 milliGRAM(s) Oral every 6 hours PRN Temp greater or equal to 38C (100.4F), Mild Pain (1 - 3)  aluminum hydroxide/magnesium hydroxide/simethicone Suspension 30 milliLiter(s) Oral every 4 hours PRN Dyspepsia  melatonin 3 milliGRAM(s) Oral at bedtime PRN Insomnia  ondansetron Injectable 4 milliGRAM(s) IV Push every 8 hours PRN Nausea and/or Vomiting      ======Vital Signs======  T(C): 36.7 (10-09-24 @ 11:35), Max: 36.8 (10-09-24 @ 05:55)  T(F): 98.1 (10-09-24 @ 11:35), Max: 98.2 (10-09-24 @ 05:55)  HR: 58 (10-09-24 @ :35) (58 - 82)  BP: 140/76 (10-09-24 @ 11:35) (137/75 - 140/76)  BP(mean): --  RR: 18 (10-09-24 @ 11:35) (18 - 18)  SpO2: 99% (10-09-24 @ :35) (96% - 99%)    ======Physical exams======  GENERAL: NAD  Cardiovascular: RRR, S1 S2, no m/r/g, no JVD  LUNGS: Unlabored respiration, CTABL  ABDOMEN: Soft, NTND  EXTREMITIES: Warm extremities, no edema, peripheral pulses 2+ bilaterally  NEURO: AAOx3, PERRLA    ======Labs======                15.2  4.56 >----------< 150  (MCV: 86.5)                46.8   138 | 105 | 13  -----------------------< 119[H]  3.9 | 23 | 0.96    TPro: 5.9[L] / Alb: 3.5 / TBili: 1.2 / DBili: -- / AlkPhos: 60 / ALT: 357[H] / AST: 1234[H] (10-09-24 @ 07:50)  Ca: 9.0 / Phos: -- / M.2 (10-09-24 @ 07:50)          ======Microbiology======  Urinalysis Basic - ( 09 Oct 2024 07:50 )    Color: x / Appearance: x / SG: x / pH: x  Gluc: 119 mg/dL / Ketone: x  / Bili: x / Urobili: x   Blood: x / Protein: x / Nitrite: x   Leuk Esterase: x / RBC: x / WBC x   Sq Epi: x / Non Sq Epi: x / Bacteria: x          ======I&O's======  I&O's Summary    09 Oct 2024 07:01  -  09 Oct 2024 17:21  --------------------------------------------------------  IN: 0 mL / OUT: 2100 mL / NET: -2100 mL

## 2024-10-09 NOTE — PROGRESS NOTE ADULT - ASSESSMENT
39M with PMH of HLD, GERD, [place]' syndrome p/w muscle soreness and dark urine, a/w rhabdomyolysis.  39M with PMH of HLD, GERD, [place]' syndrome p/w muscle soreness and dark urine, a/w rhabdomyolysis.

## 2024-10-09 NOTE — PROGRESS NOTE ADULT - PROBLEM SELECTOR PLAN 2
AST/ALT elevated 2/2 rhabdo  c/t monitor with treatment as above    PLAN:  f/u RUQ u/s  f/u gena's work up

## 2024-10-09 NOTE — PROGRESS NOTE ADULT - PROBLEM SELECTOR PLAN 1
pt with rhabdomyolysis in setting of intense work-out with CK ~86K and c/o upper body pain and swelling   - c/w IVF at 200cc hr for now   - pain control with tylenol   - repeat CK daily  - Cr at baseline of 1.2 - c/t monitor in setting of rhabdo

## 2024-10-10 LAB
ALBUMIN SERPL ELPH-MCNC: 4.1 G/DL — SIGNIFICANT CHANGE UP (ref 3.3–5)
ALP SERPL-CCNC: 65 U/L — SIGNIFICANT CHANGE UP (ref 40–120)
ALT FLD-CCNC: 505 U/L — HIGH (ref 10–45)
ANION GAP SERPL CALC-SCNC: 11 MMOL/L — SIGNIFICANT CHANGE UP (ref 5–17)
AST SERPL-CCNC: 1290 U/L — HIGH (ref 10–40)
BASOPHILS # BLD AUTO: 0.04 K/UL — SIGNIFICANT CHANGE UP (ref 0–0.2)
BASOPHILS NFR BLD AUTO: 0.8 % — SIGNIFICANT CHANGE UP (ref 0–2)
BILIRUB SERPL-MCNC: 1 MG/DL — SIGNIFICANT CHANGE UP (ref 0.2–1.2)
BUN SERPL-MCNC: 13 MG/DL — SIGNIFICANT CHANGE UP (ref 7–23)
CALCIUM SERPL-MCNC: 9.9 MG/DL — SIGNIFICANT CHANGE UP (ref 8.4–10.5)
CHLORIDE SERPL-SCNC: 101 MMOL/L — SIGNIFICANT CHANGE UP (ref 96–108)
CK SERPL-CCNC: CRITICAL HIGH U/L (ref 30–200)
CO2 SERPL-SCNC: 24 MMOL/L — SIGNIFICANT CHANGE UP (ref 22–31)
CREAT SERPL-MCNC: 1.08 MG/DL — SIGNIFICANT CHANGE UP (ref 0.5–1.3)
EGFR: 88 ML/MIN/1.73M2 — SIGNIFICANT CHANGE UP
EOSINOPHIL # BLD AUTO: 0.17 K/UL — SIGNIFICANT CHANGE UP (ref 0–0.5)
EOSINOPHIL NFR BLD AUTO: 3.4 % — SIGNIFICANT CHANGE UP (ref 0–6)
GLUCOSE SERPL-MCNC: 101 MG/DL — HIGH (ref 70–99)
HCT VFR BLD CALC: 49.1 % — SIGNIFICANT CHANGE UP (ref 39–50)
HGB BLD-MCNC: 15.7 G/DL — SIGNIFICANT CHANGE UP (ref 13–17)
IMM GRANULOCYTES NFR BLD AUTO: 0.4 % — SIGNIFICANT CHANGE UP (ref 0–0.9)
LYMPHOCYTES # BLD AUTO: 1.27 K/UL — SIGNIFICANT CHANGE UP (ref 1–3.3)
LYMPHOCYTES # BLD AUTO: 25.4 % — SIGNIFICANT CHANGE UP (ref 13–44)
MAGNESIUM SERPL-MCNC: 1.9 MG/DL — SIGNIFICANT CHANGE UP (ref 1.6–2.6)
MCHC RBC-ENTMCNC: 27.5 PG — SIGNIFICANT CHANGE UP (ref 27–34)
MCHC RBC-ENTMCNC: 32 GM/DL — SIGNIFICANT CHANGE UP (ref 32–36)
MCV RBC AUTO: 86 FL — SIGNIFICANT CHANGE UP (ref 80–100)
MONOCYTES # BLD AUTO: 0.38 K/UL — SIGNIFICANT CHANGE UP (ref 0–0.9)
MONOCYTES NFR BLD AUTO: 7.6 % — SIGNIFICANT CHANGE UP (ref 2–14)
NEUTROPHILS # BLD AUTO: 3.12 K/UL — SIGNIFICANT CHANGE UP (ref 1.8–7.4)
NEUTROPHILS NFR BLD AUTO: 62.4 % — SIGNIFICANT CHANGE UP (ref 43–77)
NRBC # BLD: 0 /100 WBCS — SIGNIFICANT CHANGE UP (ref 0–0)
PHOSPHATE SERPL-MCNC: 4.2 MG/DL — SIGNIFICANT CHANGE UP (ref 2.5–4.5)
PLATELET # BLD AUTO: 167 K/UL — SIGNIFICANT CHANGE UP (ref 150–400)
POTASSIUM SERPL-MCNC: 4.1 MMOL/L — SIGNIFICANT CHANGE UP (ref 3.5–5.3)
POTASSIUM SERPL-SCNC: 4.1 MMOL/L — SIGNIFICANT CHANGE UP (ref 3.5–5.3)
PROT SERPL-MCNC: 6.8 G/DL — SIGNIFICANT CHANGE UP (ref 6–8.3)
RBC # BLD: 5.71 M/UL — SIGNIFICANT CHANGE UP (ref 4.2–5.8)
RBC # FLD: 13.4 % — SIGNIFICANT CHANGE UP (ref 10.3–14.5)
SODIUM SERPL-SCNC: 136 MMOL/L — SIGNIFICANT CHANGE UP (ref 135–145)
WBC # BLD: 5 K/UL — SIGNIFICANT CHANGE UP (ref 3.8–10.5)
WBC # FLD AUTO: 5 K/UL — SIGNIFICANT CHANGE UP (ref 3.8–10.5)

## 2024-10-10 PROCEDURE — 99232 SBSQ HOSP IP/OBS MODERATE 35: CPT | Mod: GC

## 2024-10-10 RX ORDER — SODIUM CHLORIDE IRRIG SOLUTION 0.9 %
1000 SOLUTION, IRRIGATION IRRIGATION
Refills: 0 | Status: DISCONTINUED | OUTPATIENT
Start: 2024-10-10 | End: 2024-10-10

## 2024-10-10 RX ORDER — SODIUM CHLORIDE IRRIG SOLUTION 0.9 %
1000 SOLUTION, IRRIGATION IRRIGATION
Refills: 0 | Status: DISCONTINUED | OUTPATIENT
Start: 2024-10-10 | End: 2024-10-11

## 2024-10-10 RX ADMIN — Medication 200 MILLILITER(S): at 09:34

## 2024-10-10 NOTE — PROGRESS NOTE ADULT - ASSESSMENT
39M with PMH of HLD, GERD, [place]' syndrome p/w muscle soreness and dark urine after working out/lifting more than usual found to have rhabdomyolysis.

## 2024-10-10 NOTE — PROGRESS NOTE ADULT - PROBLEM SELECTOR PLAN 4
VTE ppx - low risk, encourage OOB   Diet - regular   Dispo - pending clinical improvement     Med management:  Holding statin inpt - can resume as outpt   resume testosterone after discharge VTE ppx - low risk, encourage OOB   Diet - regular   Dispo - pending clinical improvement     Med management:  Holding statin inpt - can resume as outpt after PCP follow up  Follow up with endocrinologist after discharge regarding testosterone

## 2024-10-10 NOTE — PROGRESS NOTE ADULT - PROBLEM SELECTOR PLAN 1
Patient with rhabdomyolysis in setting of intense work-out with CK ~86K on presentation and uptrending as of 10/9   - c/w IVF at 200cc hr for now   - pain control with tylenol   - repeat CK daily  - Cr at baseline but continue to monitor in setting of rhabdo Patient with rhabdomyolysis in setting of intense work-out with CK ~86K on presentation, peaked 10/9 and is now downtrending (84k)   - c/w IVF at 200cc hr for now   - pain control with tylenol   - repeat CK daily  - Cr at baseline but continue to monitor in setting of rhabdo

## 2024-10-10 NOTE — PROGRESS NOTE ADULT - SUBJECTIVE AND OBJECTIVE BOX
Patient is a 39y old  Male who presents with a chief complaint of muscle soreness, dark urine (09 Oct 2024 07:39)      ======Overnight/Subjective======  Overnight    Subjective    Brief daily plan    ======Medications======  MEDICATIONS  (STANDING):  influenza   Vaccine 0.5 milliLiter(s) IntraMuscular once    MEDICATIONS  (PRN):  acetaminophen     Tablet .. 650 milliGRAM(s) Oral every 6 hours PRN Temp greater or equal to 38C (100.4F), Mild Pain (1 - 3)  aluminum hydroxide/magnesium hydroxide/simethicone Suspension 30 milliLiter(s) Oral every 4 hours PRN Dyspepsia  melatonin 3 milliGRAM(s) Oral at bedtime PRN Insomnia  ondansetron Injectable 4 milliGRAM(s) IV Push every 8 hours PRN Nausea and/or Vomiting      ======Vital Signs======  T(C): 36.6 (10-10-24 @ 06:05), Max: 37.1 (10-09-24 @ 17:08)  T(F): 97.8 (10-10-24 @ 06:05), Max: 98.8 (10-09-24 @ 21:49)  HR: 60 (10-10-24 @ 06:05) (58 - 69)  BP: 129/67 (10-10-24 @ 06:05) (129/67 - 140/76)  BP(mean): --  RR: 20 (10-10-24 @ 06:05) (18 - 20)  SpO2: 94% (10-10-24 @ 06:05) (94% - 99%)    ======Physical exams======  GENERAL: NAD  Cardiovascular: RRR, S1 S2, no m/r/g  LUNGS: Unlabored respiration, CTABL  ABDOMEN: Soft, NTND  EXTREMITIES: Warm extremities, no edema, peripheral pulses 2+ bilaterally  NEURO: AAOx3    ======Labs======                15.7  5.00 >----------< 167  (MCV: 86.0)                49.1   138 | 105 | 13  -----------------------< 119[H]  3.9 | 23 | 0.96    TPro: 5.9[L] / Alb: 3.5 / TBili: 1.2 / DBili: -- / AlkPhos: 60 / ALT: 357[H] / AST: 1234[H] (10-09-24 @ 07:50)  Ca: 9.0 / Phos: -- / M.2 (10-09-24 @ 07:50)          ======Microbiology======  Urinalysis Basic - ( 09 Oct 2024 07:50 )    Color: x / Appearance: x / SG: x / pH: x  Gluc: 119 mg/dL / Ketone: x  / Bili: x / Urobili: x   Blood: x / Protein: x / Nitrite: x   Leuk Esterase: x / RBC: x / WBC x   Sq Epi: x / Non Sq Epi: x / Bacteria: x          ======I&O's======  I&O's Summary    09 Oct 2024 07:01  -  10 Oct 2024 07:00  --------------------------------------------------------  IN: 0 mL / OUT: 2100 mL / NET: -2100 mL         Patient is a 39y old  Male who presents with a chief complaint of muscle soreness, dark urine (09 Oct 2024 07:39)      ======Overnight/Subjective======  Overnight, the patient had no complaints. Feels better than previously. ROS otherwise negative.    Brief daily plan: trend CK    ======Medications======  MEDICATIONS  (STANDING):  influenza   Vaccine 0.5 milliLiter(s) IntraMuscular once    MEDICATIONS  (PRN):  acetaminophen     Tablet .. 650 milliGRAM(s) Oral every 6 hours PRN Temp greater or equal to 38C (100.4F), Mild Pain (1 - 3)  aluminum hydroxide/magnesium hydroxide/simethicone Suspension 30 milliLiter(s) Oral every 4 hours PRN Dyspepsia  melatonin 3 milliGRAM(s) Oral at bedtime PRN Insomnia  ondansetron Injectable 4 milliGRAM(s) IV Push every 8 hours PRN Nausea and/or Vomiting      ======Vital Signs======  T(C): 36.6 (10-10-24 @ 06:05), Max: 37.1 (10-09-24 @ 17:08)  T(F): 97.8 (10-10-24 @ 06:05), Max: 98.8 (10-09-24 @ 21:49)  HR: 60 (10-10-24 @ 06:05) (58 - 69)  BP: 129/67 (10-10-24 @ 06:05) (129/67 - 140/76)  BP(mean): --  RR: 20 (10-10-24 @ 06:05) (18 - 20)  SpO2: 94% (10-10-24 @ 06:05) (94% - 99%)    ======Physical exams======  GENERAL: NAD  Cardiovascular: RRR, S1 S2, no m/r/g  LUNGS: Unlabored respiration, CTABL  ABDOMEN: Soft, NTND  EXTREMITIES: Warm extremities, no edema, peripheral pulses 2+ bilaterally  NEURO: AAOx3    ======Labs======                15.7  5.00 >----------< 167  (MCV: 86.0)                49.1   138 | 105 | 13  -----------------------< 119[H]  3.9 | 23 | 0.96    TPro: 5.9[L] / Alb: 3.5 / TBili: 1.2 / DBili: -- / AlkPhos: 60 / ALT: 357[H] / AST: 1234[H] (10-09-24 @ 07:50)  Ca: 9.0 / Phos: -- / M.2 (10-09-24 @ 07:50)          ======Microbiology======  Urinalysis Basic - ( 09 Oct 2024 07:50 )    Color: x / Appearance: x / SG: x / pH: x  Gluc: 119 mg/dL / Ketone: x  / Bili: x / Urobili: x   Blood: x / Protein: x / Nitrite: x   Leuk Esterase: x / RBC: x / WBC x   Sq Epi: x / Non Sq Epi: x / Bacteria: x          ======I&O's======  I&O's Summary    09 Oct 2024 07:01  -  10 Oct 2024 07:00  --------------------------------------------------------  IN: 0 mL / OUT: 2100 mL / NET: -2100 mL

## 2024-10-11 ENCOUNTER — TRANSCRIPTION ENCOUNTER (OUTPATIENT)
Age: 39
End: 2024-10-11

## 2024-10-11 VITALS
TEMPERATURE: 98 F | SYSTOLIC BLOOD PRESSURE: 132 MMHG | HEART RATE: 75 BPM | RESPIRATION RATE: 18 BRPM | OXYGEN SATURATION: 95 % | DIASTOLIC BLOOD PRESSURE: 75 MMHG

## 2024-10-11 LAB
ALBUMIN SERPL ELPH-MCNC: 3.8 G/DL — SIGNIFICANT CHANGE UP (ref 3.3–5)
ALP SERPL-CCNC: 69 U/L — SIGNIFICANT CHANGE UP (ref 40–120)
ALT FLD-CCNC: 466 U/L — HIGH (ref 10–45)
ANION GAP SERPL CALC-SCNC: 12 MMOL/L — SIGNIFICANT CHANGE UP (ref 5–17)
AST SERPL-CCNC: 870 U/L — HIGH (ref 10–40)
BILIRUB SERPL-MCNC: 0.8 MG/DL — SIGNIFICANT CHANGE UP (ref 0.2–1.2)
BUN SERPL-MCNC: 14 MG/DL — SIGNIFICANT CHANGE UP (ref 7–23)
CALCIUM SERPL-MCNC: 9.5 MG/DL — SIGNIFICANT CHANGE UP (ref 8.4–10.5)
CHLORIDE SERPL-SCNC: 102 MMOL/L — SIGNIFICANT CHANGE UP (ref 96–108)
CK SERPL-CCNC: CRITICAL HIGH U/L (ref 30–200)
CO2 SERPL-SCNC: 24 MMOL/L — SIGNIFICANT CHANGE UP (ref 22–31)
CREAT SERPL-MCNC: 1.01 MG/DL — SIGNIFICANT CHANGE UP (ref 0.5–1.3)
EGFR: 96 ML/MIN/1.73M2 — SIGNIFICANT CHANGE UP
GLUCOSE SERPL-MCNC: 107 MG/DL — HIGH (ref 70–99)
MAGNESIUM SERPL-MCNC: 1.8 MG/DL — SIGNIFICANT CHANGE UP (ref 1.6–2.6)
PHOSPHATE SERPL-MCNC: 3.6 MG/DL — SIGNIFICANT CHANGE UP (ref 2.5–4.5)
POTASSIUM SERPL-MCNC: 3.9 MMOL/L — SIGNIFICANT CHANGE UP (ref 3.5–5.3)
POTASSIUM SERPL-SCNC: 3.9 MMOL/L — SIGNIFICANT CHANGE UP (ref 3.5–5.3)
PROT SERPL-MCNC: 6.2 G/DL — SIGNIFICANT CHANGE UP (ref 6–8.3)
SODIUM SERPL-SCNC: 138 MMOL/L — SIGNIFICANT CHANGE UP (ref 135–145)

## 2024-10-11 PROCEDURE — 99239 HOSP IP/OBS DSCHRG MGMT >30: CPT | Mod: GC

## 2024-10-11 PROCEDURE — 76705 ECHO EXAM OF ABDOMEN: CPT

## 2024-10-11 PROCEDURE — 82550 ASSAY OF CK (CPK): CPT

## 2024-10-11 PROCEDURE — 99285 EMERGENCY DEPT VISIT HI MDM: CPT

## 2024-10-11 PROCEDURE — 96374 THER/PROPH/DIAG INJ IV PUSH: CPT

## 2024-10-11 PROCEDURE — 83735 ASSAY OF MAGNESIUM: CPT

## 2024-10-11 PROCEDURE — 85027 COMPLETE CBC AUTOMATED: CPT

## 2024-10-11 PROCEDURE — 84100 ASSAY OF PHOSPHORUS: CPT

## 2024-10-11 PROCEDURE — 85025 COMPLETE CBC W/AUTO DIFF WBC: CPT

## 2024-10-11 PROCEDURE — 81001 URINALYSIS AUTO W/SCOPE: CPT

## 2024-10-11 PROCEDURE — 36415 COLL VENOUS BLD VENIPUNCTURE: CPT

## 2024-10-11 PROCEDURE — 81404 MOPATH PROCEDURE LEVEL 5: CPT

## 2024-10-11 PROCEDURE — 80053 COMPREHEN METABOLIC PANEL: CPT

## 2024-10-11 RX ORDER — PANTOPRAZOLE SODIUM 40 MG/1
1 TABLET, DELAYED RELEASE ORAL
Refills: 0 | DISCHARGE

## 2024-10-11 RX ORDER — ATORVASTATIN CALCIUM 10 MG/1
1 TABLET, FILM COATED ORAL
Refills: 0 | DISCHARGE

## 2024-10-11 RX ADMIN — Medication 200 MILLILITER(S): at 05:27

## 2024-10-11 RX ADMIN — Medication 200 MILLILITER(S): at 10:27

## 2024-10-11 NOTE — DISCHARGE NOTE PROVIDER - NSDCMRMEDTOKEN_GEN_ALL_CORE_FT
atorvastatin 20 mg oral tablet: 1 tab(s) orally once a day  pantoprazole 40 mg oral delayed release tablet: 1 tab(s) orally once a day

## 2024-10-11 NOTE — PROGRESS NOTE ADULT - PROBLEM SELECTOR PLAN 1
Patient with rhabdomyolysis in setting of intense work-out with CK ~86K on presentation, peaked 10/9 and is now downtrending (84k)   - c/w IVF at 200cc hr for now   - pain control with tylenol   - repeat CK daily  - Cr at baseline but continue to monitor in setting of rhabdo Patient with rhabdomyolysis in setting of intense work-out with CK ~86K on presentation, peaked 10/9 and is now downtrending appropriately (54 K)  - c/w IVF at 200cc hr till DC  - Cr at baseline  - Outpatient follow up in 1 week to check: 1) LFTs 2) CK 3) Cr 4) gilbert's test 5) discuss RUQ u/s 6  - No working out until CK returns to normal  - No Statin until LFTs return to normal  - Hydrate

## 2024-10-11 NOTE — PROGRESS NOTE ADULT - PROBLEM SELECTOR PLAN 2
AST/ALT elevated 2/2 rhabdo  c/t monitor with treatment as above  RUQ ultrasound showed mild diffuse fatty liver    PLAN:  f/u gilbert's work up  outpatient follow up AST/ALT elevated 2/2 rhabdo  c/t monitor with treatment as above  RUQ ultrasound showed mild diffuse fatty liver    PLAN:  -outpatient follow up

## 2024-10-11 NOTE — DISCHARGE NOTE PROVIDER - CARE PROVIDER_API CALL
Shun Maldonado  Internal Medicine  [address], [address]  [place] [address]  Phone: [phone]  Fax: [phone]  Established Patient  Follow Up Time: 1 week

## 2024-10-11 NOTE — DISCHARGE NOTE PROVIDER - NSDCCPTREATMENT_GEN_ALL_CORE_FT
PRINCIPAL PROCEDURE  Procedure: Right upper quadrant ultrasound  Findings and Treatment: INTERPRETATION:    CLINICAL INFORMATION: 39-year-old male patient with   elevated LFTs, history of Gilbert's syndrome, evaluate for fatty changes to the liver. Normal creatinine.  COMPARISON: None available.  TECHNIQUE: Sonography of the right upper quadrant.  FINDINGS:  Liver: 17.3 cm. Mildly heterogeneous echotexture/mildly increased   echogenicity. Smooth contour.  Bile ducts: Normal caliber. Common bile duct measures 4 mm.  Gallbladder: Within normal limits. Negative sonographic Monroy's sign.  Pancreas: Visualized portions are within normal limits.  Right kidney: 12.6 cm. No hydronephrosis. Mild increased parenchymal   echogenicity.  Ascites: None.  Aorta and IVC: Visualized portions are within normal limits.  Main portal vein: Patent with hepatopetal flow.  IMPRESSION: Suggestion of mild diffuse hepatic steatosis.

## 2024-10-11 NOTE — DISCHARGE NOTE PROVIDER - NSDCCPCAREPLAN_GEN_ALL_CORE_FT
PRINCIPAL DISCHARGE DIAGNOSIS  Diagnosis: Rhabdomyolysis  Assessment and Plan of Treatment: You came to the hospital with dark urine and extreme muscle pain and were found to have rhabdomyolysis. This occurs when your muscle begins to break down and it releases toxins into the blood. The toxins can cause damage to several other organs in your body; most commonly the kidney (which filters the toxin) and your liver. We gave you fluids to wash the toxin out and your level of creatine kinase (or CK level) has come significantly down. Please continue to drink plenty of fluids and to monitor your urine for reddening. If you begin to experience any increasing muscle pain or changes in the color of your urine, please revisit the emergency department.  To Do:  -See your primary care doctor in 1 week for follow up bloodwork  -DO NOT work-out or take your pantoprazole or statin until you have blood work done by him  -Share with your PCP the right upper quadrant u/s results   -We also tested you for Gilbert's disease, the results are available in our system. Please follow-up with your PCP about them.        SECONDARY DISCHARGE DIAGNOSES  Diagnosis: Medication management  Assessment and Plan of Treatment: You shared with us that you are taking testosterone from a colleague at your gym. Given that testosterone is a hormone that requires the supervision of a medical professional, we recommend follow-up with an endocrinologist after leaving the hospital. We also strongly recommend stopping the testosterone from a non-medical source. The recommended provider's information is on the next page.

## 2024-10-11 NOTE — DISCHARGE NOTE PROVIDER - NSFOLLOWUPCLINICS_GEN_ALL_ED_FT
[facility] Endocrinology  Endocrinology  [address] [place] [address]  Phone: [phone]  Fax:   Follow Up Time: 1 week

## 2024-10-11 NOTE — PROGRESS NOTE ADULT - SUBJECTIVE AND OBJECTIVE BOX
Patient is a 39y old  Male who presents with a chief complaint of muscle soreness, dark urine (10 Oct 2024 07:38)      ======Overnight/Subjective======  Overnight    Subjective    Brief daily plan    ======Medications======  MEDICATIONS  (STANDING):  influenza   Vaccine 0.5 milliLiter(s) IntraMuscular once  lactated ringers. 1000 milliLiter(s) (200 mL/Hr) IV Continuous <Continuous>    MEDICATIONS  (PRN):  acetaminophen     Tablet .. 650 milliGRAM(s) Oral every 6 hours PRN Temp greater or equal to 38C (100.4F), Mild Pain (1 - 3)  aluminum hydroxide/magnesium hydroxide/simethicone Suspension 30 milliLiter(s) Oral every 4 hours PRN Dyspepsia  melatonin 3 milliGRAM(s) Oral at bedtime PRN Insomnia  ondansetron Injectable 4 milliGRAM(s) IV Push every 8 hours PRN Nausea and/or Vomiting      ======Vital Signs======  T(C): 36.7 (10-11-24 @ 05:44), Max: 36.7 (10-10-24 @ 13:38)  T(F): 98 (10-11-24 @ 05:44), Max: 98 (10-10-24 @ 13:38)  HR: 70 (10-11-24 @ 05:44) (70 - 77)  BP: 106/59 (10-11-24 @ 05:44) (106/59 - 122/78)  BP(mean): --  RR: 18 (10-11-24 @ 05:44) (18 - 18)  SpO2: 94% (10-11-24 @ 05:44) (94% - 98%)    ======Physical exams======  GENERAL: NAD  Cardiovascular: RRR, S1 S2, no m/r/g, no JVD  LUNGS: Unlabored respiration, CTABL  ABDOMEN: Soft, NTND  EXTREMITIES: Warm extremities, no edema, peripheral pulses 2+ bilaterally  NEURO: AAOx3, PERRLA    ======Labs======                15.7  5.00 >----------< 167  (MCV: 86.0)                49.1   136 | 101 | 13  -----------------------< 101[H]  4.1 | 24 | 1.08    TPro: 6.8 / Alb: 4.1 / TBili: 1.0 / DBili: -- / AlkPhos: 65 / ALT: 505[H] / AST: 1290[H] (10-10-24 @ 07:08)  Ca: 9.9 / Phos: 4.2 / M.9 (10-10-24 @ 07:08)          ======Microbiology======  Urinalysis Basic - ( 10 Oct 2024 07:08 )    Color: x / Appearance: x / SG: x / pH: x  Gluc: 101 mg/dL / Ketone: x  / Bili: x / Urobili: x   Blood: x / Protein: x / Nitrite: x   Leuk Esterase: x / RBC: x / WBC x   Sq Epi: x / Non Sq Epi: x / Bacteria: x          ======I&O's======  I&O's Summary    10 Oct 2024 07:01  -  11 Oct 2024 07:00  --------------------------------------------------------  IN: 0 mL / OUT: 600 mL / NET: -600 mL         Patient is a 39y old  Male who presents with a chief complaint of muscle soreness, dark urine (10 Oct 2024 07:38)      ======Overnight/Subjective======  Overnight, the patient had no events. ROS negative. Soreness is 90% better and his urine is pale yellow as opposed to the dark orange when he was initially admitted. CK has downtrended appropriately.    Brief daily plan: DC home    ======Medications======  MEDICATIONS  (STANDING):  influenza   Vaccine 0.5 milliLiter(s) IntraMuscular once  lactated ringers. 1000 milliLiter(s) (200 mL/Hr) IV Continuous <Continuous>    MEDICATIONS  (PRN):  acetaminophen     Tablet .. 650 milliGRAM(s) Oral every 6 hours PRN Temp greater or equal to 38C (100.4F), Mild Pain (1 - 3)  aluminum hydroxide/magnesium hydroxide/simethicone Suspension 30 milliLiter(s) Oral every 4 hours PRN Dyspepsia  melatonin 3 milliGRAM(s) Oral at bedtime PRN Insomnia  ondansetron Injectable 4 milliGRAM(s) IV Push every 8 hours PRN Nausea and/or Vomiting      ======Vital Signs======  T(C): 36.7 (10-11-24 @ 05:44), Max: 36.7 (10-10-24 @ 13:38)  T(F): 98 (10-11-24 @ 05:44), Max: 98 (10-10-24 @ 13:38)  HR: 70 (10-11-24 @ 05:44) (70 - 77)  BP: 106/59 (10-11-24 @ 05:44) (106/59 - 122/78)  BP(mean): --  RR: 18 (10-11-24 @ 05:44) (18 - 18)  SpO2: 94% (10-11-24 @ 05:44) (94% - 98%)    ======Physical exams======  GENERAL: NAD  Cardiovascular: RRR, S1 S2, no m/r/g  LUNGS: Unlabored respiration, CTABL  ABDOMEN: Soft, NTND  EXTREMITIES: Warm extremities  NEURO: AAOx3    ======Labs======                15.7  5.00 >----------< 167  (MCV: 86.0)                49.1   136 | 101 | 13  -----------------------< 101[H]  4.1 | 24 | 1.08    TPro: 6.8 / Alb: 4.1 / TBili: 1.0 / DBili: -- / AlkPhos: 65 / ALT: 505[H] / AST: 1290[H] (10-10-24 @ 07:08)  Ca: 9.9 / Phos: 4.2 / M.9 (10-10-24 @ 07:08)          ======Microbiology======  Urinalysis Basic - ( 10 Oct 2024 07:08 )    Color: x / Appearance: x / SG: x / pH: x  Gluc: 101 mg/dL / Ketone: x  / Bili: x / Urobili: x   Blood: x / Protein: x / Nitrite: x   Leuk Esterase: x / RBC: x / WBC x   Sq Epi: x / Non Sq Epi: x / Bacteria: x          ======I&O's======  I&O's Summary    10 Oct 2024 07:01  -  11 Oct 2024 07:00  --------------------------------------------------------  IN: 0 mL / OUT: 600 mL / NET: -600 mL

## 2024-10-11 NOTE — PROGRESS NOTE ADULT - PROBLEM SELECTOR PLAN 3
Tbili 2.3 - likely 2/2 dehydration in setting of [place] syndrome   - c/t monitor -discuss with outpatient PCP

## 2024-10-11 NOTE — DISCHARGE NOTE PROVIDER - HOSPITAL COURSE
HPI:  39M with PMH of HLD, GERD, [place]' syndrome p/w muscle soreness and dark urine. Pt states he has vrial gastroenteritis symptoms over the weekend with abd pain, nausea and diarrhea, which self resolved after 36 hours. On day PTA, pt went to the gym and worked out with more intensity than recent weeks, specifically his upper body. Later that day at work, he was helping to lift heavy boxes as well. That night he felt soreness in b/l UE and chest wall, and took some tylenol. Overnight and into this morning, the pain became more intense and he noted swelling of b/l UEs. He noted dark urine this AM which prompted him to come to ED for further evaluation.   Endorsing non radiating MSK pain in b/l UEs and b/l lateral chest walls, worse with movement, +swollen, no radiation, no SOB.  Denies any HA, blurry vision, no abd pain, no n/v, +dark urine, no dysuria or blood, last BM yest, normal, no blood, no diarrhea, no LE pain or swelling. Has never had this happen in the past.       Hospital Course:  In the hospital his creatinine, LFTs, and CK were monitored. All downtrended during the time of his admission.    Important Medication Changes and Reason:  STOP statin until LFTs normalize    Active or Pending Issues Requiring Follow-up:  Rhabdo follow-up with PCP in 1 week    Advanced Directives:   [X] Full code  [ ] DNR  [ ] Hospice    Discharge Diagnoses:  Rhabdomyolysis        HPI:  39M with PMH of HLD, GERD, [place]' syndrome p/w muscle soreness and dark urine. Pt states he has viral gastroenteritis symptoms over the weekend with abd pain, nausea and diarrhea, which self resolved after 36 hours. On day PTA, pt went to the gym and worked out with more intensity than recent weeks, specifically his upper body. Later that day at work, he was helping to lift heavy boxes as well. That night he felt soreness in b/l UE and chest wall, and took some tylenol. Overnight and into this morning, the pain became more intense and he noted swelling of b/l UEs. He noted dark urine this AM which prompted him to come to ED for further evaluation.   Endorsing non radiating MSK pain in b/l UEs and b/l lateral chest walls, worse with movement, +swollen, no radiation, no SOB.  Denies any HA, blurry vision, no abd pain, no n/v, +dark urine, no dysuria or blood, last BM yest, normal, no blood, no diarrhea, no LE pain or swelling. Has never had this happen in the past.       Hospital Course:  In the hospital his creatinine, LFTs, and CK were monitored. All downtrended during the time of his admission. On the day of discharge his CK was 54,000 down from 84,000 with a peak of 92,000. His creatinine was 1.01 and his LFTs are still elevated: AST: 870, ALT: 466.    Important Medication Changes and Reason:  STOP statin until LFTs normalize    Active or Pending Issues Requiring Follow-up:  Rhabdo follow-up with PCP in 1 week    Advanced Directives:   [X] Full code  [ ] DNR  [ ] Hospice    Discharge Diagnoses:  Rhabdomyolysis

## 2024-10-11 NOTE — DISCHARGE NOTE NURSING/CASE MANAGEMENT/SOCIAL WORK - PATIENT PORTAL LINK FT
You can access the FollowMyHealth Patient Portal offered by [facility] by registering at the following website: [email or web address]. By joining [employer]’s FollowMyHealth portal, you will also be able to view your health information using other applications (apps) compatible with our system.

## 2024-10-11 NOTE — DISCHARGE NOTE NURSING/CASE MANAGEMENT/SOCIAL WORK - NSDCFUADDAPPT_GEN_ALL_CORE_FT
APPTS ARE READY TO BE MADE: [X] YES    Best Family or Patient Contact (if needed):    Additional Information about above appointments (if needed):    1: PCP needs to be in 1 week (please)  2: Endocrinologist as soon as possible  3:     Other comments or requests:

## 2024-10-11 NOTE — DISCHARGE NOTE PROVIDER - ATTENDING DISCHARGE PHYSICAL EXAMINATION:
CONSTITUTIONAL: NAD, well-developed   EYES: PERRLA; conjunctiva and sclera clear  ENMT: Moist oral mucosa, no pharyngeal injection or exudates   NECK: Supple   RESPIRATORY: Normal respiratory effort; lungs are clear to auscultation bilaterally  CARDIOVASCULAR: Regular rate and rhythm, normal S1 and S2   ABDOMEN: Nontender to palpation, normoactive bowel sounds  MUSCULOSKELETAL: no clubbing or cyanosis of digits; no joint swelling or tenderness to palpation  PSYCH: A+O to person, place, and time; affect appropriate  NEUROLOGY: no gross sensory deficits   SKIN: No rashes

## 2024-10-11 NOTE — DISCHARGE NOTE PROVIDER - NSDCFUADDAPPT_GEN_ALL_CORE_FT
APPTS ARE READY TO BE MADE: [X] YES    Best Family or Patient Contact (if needed):    Additional Information about above appointments (if needed):    1: PCP needs to be in 1 week (please)  2: Endocrinologist as soon as possible  3:     Other comments or requests:    APPTS ARE READY TO BE MADE: [X] YES    Best Family or Patient Contact (if needed):    Additional Information about above appointments (if needed):    1: PCP needs to be in 1 week (please)  2: Endocrinologist as soon as possible  3:     Other comments or requests:     Prior to outreaching the patient, it was visible that the patient has secured a follow up appointment which was not scheduled by our team. Patient was scheduled with Dr. Maldonado on 10/18 at [address] [place] [address]    Patient advises they do not want our assistance and prefer to coordinate the [facility] appointments on their own. No information was provided to the patient, however pending the referral to capture potential appointment data once scheduled by the patient.

## 2024-10-11 NOTE — PROGRESS NOTE ADULT - ATTENDING COMMENTS
Patient is a 39 year old male, with PMH of HLD, GERD, [place]' syndrome, who presented to the ED due to muscle soreness and dark urine, a/w rhabdomyolysis.     - continue with aggressive fluid resuscitation; monitor CPK daily; would hope to see downtrend prior to DC; likely peak in next 1-2 days   - monitor kidney function; currently wnl   - urine clearing up; continue to monitor  - hold statin for now in setting of transaminitis; trend LFTs  - f/u RUQ US     Rest as above. Discussed with HS.
Patient is a 39 year old male, with PMH of HLD, GERD, [place]' syndrome, who presented to the ED due to muscle soreness and dark urine, a/w rhabdomyolysis.     - continue with aggressive fluid resuscitation; monitor CPK daily; started to downtrend    - monitor kidney function; currently wnl   - urine clearing up; continue to monitor  - hold statin for now in setting of transaminitis; trend LFTs  - RUQ US with hepatic steatosis  - possible DC in AM if CPK continues to downtrend     Rest as above. Discussed with HS.
Patient is a 39 year old male, with PMH of HLD, GERD, [place]' syndrome, who presented to the ED due to muscle soreness and dark urine, a/w rhabdomyolysis.     - continue with aggressive fluid resuscitation; monitor CPK; seems to have peaked and has now started to downtrend; will likely take days for CPK to return to normal but patient feeling overall much improved and urine normal yellow color    - monitor kidney function; currently wnl   - hold statin for now in setting of transaminitis; trend LFTs; recommend to continue to hold off on statin for now until outpatient follow up   - RUQ US with hepatic steatosis  - encouraged patient to avoid exercise for at least 1-2 weeks and avoid strenuous activity     DC planning to home today   40 mins spent on DC planning     Rest as above. Discussed with HS.

## 2024-10-11 NOTE — DISCHARGE NOTE NURSING/CASE MANAGEMENT/SOCIAL WORK - NSDCPEFALRISK_GEN_ALL_CORE
For information on Fall & Injury Prevention, visit: [email or web address] OR  [email or web address] OR  https://www.cdc.gov/steadi/patient.html

## 2024-10-11 NOTE — PROGRESS NOTE ADULT - ASSESSMENT
39M with PMH of HLD, GERD, [place]' syndrome p/w muscle soreness and dark urine after working out/lifting more than usual found to have rhabdomyolysis.  39M with PMH of HLD, GERD, [place]' syndrome p/w muscle soreness and dark urine after working out/lifting more than usual found to have rhabdomyolysis, CK is downtrending appropriately. Patient is stable for DC.

## 2024-10-11 NOTE — PROGRESS NOTE ADULT - PROBLEM SELECTOR PLAN 4
VTE ppx - low risk, encourage OOB   Diet - regular   Dispo - pending clinical improvement     Med management:  Holding statin inpt - can resume as outpt after PCP follow up  Follow up with endocrinologist after discharge regarding testosterone

## 2024-10-15 PROBLEM — K21.9 GASTRO-ESOPHAGEAL REFLUX DISEASE WITHOUT ESOPHAGITIS: Chronic | Status: ACTIVE | Noted: 2024-10-08

## 2024-10-15 PROBLEM — E78.5 HYPERLIPIDEMIA, UNSPECIFIED: Chronic | Status: ACTIVE | Noted: 2024-10-08

## 2024-10-15 PROBLEM — E80.4 GILBERT SYNDROME: Chronic | Status: ACTIVE | Noted: 2024-10-08

## 2024-10-15 LAB
ANNOTATION COMMENT IMP: SIGNIFICANT CHANGE UP
FULL GENE SEQUENCE RESULT: SIGNIFICANT CHANGE UP
METHOD:: SIGNIFICANT CHANGE UP
MOL DX INTERP BLD/T QL: SIGNIFICANT CHANGE UP
REVIEWED BY: SIGNIFICANT CHANGE UP
TA REPEAT RESULT: ABNORMAL
TEST PERFORMANCE INFO SPEC: SIGNIFICANT CHANGE UP
UGT1A1 GENE MUT ANL BLD/T: SIGNIFICANT CHANGE UP

## 2024-10-18 ENCOUNTER — APPOINTMENT (OUTPATIENT)
Dept: INTERNAL MEDICINE | Facility: CLINIC | Age: 39
End: 2024-10-18
Payer: COMMERCIAL

## 2024-10-18 VITALS
BODY MASS INDEX: 27.35 KG/M2 | WEIGHT: 191 LBS | OXYGEN SATURATION: 98 % | DIASTOLIC BLOOD PRESSURE: 85 MMHG | SYSTOLIC BLOOD PRESSURE: 120 MMHG | TEMPERATURE: 98.5 F | HEART RATE: 85 BPM | HEIGHT: 70 IN

## 2024-10-18 DIAGNOSIS — E78.5 HYPERLIPIDEMIA, UNSPECIFIED: ICD-10-CM

## 2024-10-18 DIAGNOSIS — Z13.228 ENCOUNTER FOR SCREENING FOR OTHER METABOLIC DISORDERS: ICD-10-CM

## 2024-10-18 PROCEDURE — 99214 OFFICE O/P EST MOD 30 MIN: CPT

## 2024-10-18 PROCEDURE — 99496 TRANSJ CARE MGMT HIGH F2F 7D: CPT

## 2024-10-21 LAB
ALBUMIN SERPL ELPH-MCNC: 4.6 G/DL
ALP BLD-CCNC: 63 U/L
ALT SERPL-CCNC: 139 U/L
ANION GAP SERPL CALC-SCNC: 15 MMOL/L
APPEARANCE: CLEAR
AST SERPL-CCNC: 38 U/L
BACTERIA: NEGATIVE /HPF
BASOPHILS # BLD AUTO: 0.04 K/UL
BASOPHILS NFR BLD AUTO: 0.7 %
BILIRUB DIRECT SERPL-MCNC: 0.2 MG/DL
BILIRUB INDIRECT SERPL-MCNC: 1.2 MG/DL
BILIRUB SERPL-MCNC: 1.4 MG/DL
BILIRUBIN URINE: NEGATIVE
BLOOD URINE: NEGATIVE
BUN SERPL-MCNC: 24 MG/DL
CALCIUM SERPL-MCNC: 10 MG/DL
CAST: 0 /LPF
CHLORIDE SERPL-SCNC: 99 MMOL/L
CHOLEST SERPL-MCNC: 324 MG/DL
CK SERPL-CCNC: 325 U/L
CO2 SERPL-SCNC: 23 MMOL/L
COLOR: YELLOW
CREAT SERPL-MCNC: 1.17 MG/DL
EGFR: 80 ML/MIN/1.73M2
EOSINOPHIL # BLD AUTO: 0.13 K/UL
EOSINOPHIL NFR BLD AUTO: 2.4 %
EPITHELIAL CELLS: 0 /HPF
ESTIMATED AVERAGE GLUCOSE: 111 MG/DL
GLUCOSE QUALITATIVE U: NEGATIVE MG/DL
GLUCOSE SERPL-MCNC: 79 MG/DL
HBA1C MFR BLD HPLC: 5.5 %
HCT VFR BLD CALC: 52.7 %
HDLC SERPL-MCNC: 30 MG/DL
HGB BLD-MCNC: 17 G/DL
IMM GRANULOCYTES NFR BLD AUTO: 0.2 %
KETONES URINE: NEGATIVE MG/DL
LDLC SERPL CALC-MCNC: 242 MG/DL
LEUKOCYTE ESTERASE URINE: NEGATIVE
LYMPHOCYTES # BLD AUTO: 1.14 K/UL
LYMPHOCYTES NFR BLD AUTO: 20.7 %
MAN DIFF?: NORMAL
MCHC RBC-ENTMCNC: 28.1 PG
MCHC RBC-ENTMCNC: 32.3 GM/DL
MCV RBC AUTO: 87 FL
MICROSCOPIC-UA: NORMAL
MONOCYTES # BLD AUTO: 0.39 K/UL
MONOCYTES NFR BLD AUTO: 7.1 %
NEUTROPHILS # BLD AUTO: 3.79 K/UL
NEUTROPHILS NFR BLD AUTO: 68.9 %
NITRITE URINE: NEGATIVE
NONHDLC SERPL-MCNC: 294 MG/DL
PH URINE: 6
PLATELET # BLD AUTO: 192 K/UL
POTASSIUM SERPL-SCNC: 3.9 MMOL/L
PROT SERPL-MCNC: 7.5 G/DL
PROTEIN URINE: NEGATIVE MG/DL
RBC # BLD: 6.06 M/UL
RBC # FLD: 13.9 %
RED BLOOD CELLS URINE: 0 /HPF
SODIUM SERPL-SCNC: 137 MMOL/L
SPECIFIC GRAVITY URINE: 1.02
TRIGL SERPL-MCNC: 249 MG/DL
TSH SERPL-ACNC: 1.51 UIU/ML
UROBILINOGEN URINE: 0.2 MG/DL
WBC # FLD AUTO: 5.5 K/UL
WHITE BLOOD CELLS URINE: 0 /HPF

## 2024-12-24 ENCOUNTER — APPOINTMENT (OUTPATIENT)
Dept: CARDIOLOGY | Facility: CLINIC | Age: 39
End: 2024-12-24
Payer: COMMERCIAL

## 2024-12-24 VITALS
WEIGHT: 180 LBS | DIASTOLIC BLOOD PRESSURE: 80 MMHG | BODY MASS INDEX: 25.83 KG/M2 | HEART RATE: 92 BPM | RESPIRATION RATE: 14 BRPM | OXYGEN SATURATION: 97 % | SYSTOLIC BLOOD PRESSURE: 125 MMHG

## 2024-12-24 DIAGNOSIS — E78.5 HYPERLIPIDEMIA, UNSPECIFIED: ICD-10-CM

## 2024-12-24 DIAGNOSIS — E78.01 FAMILIAL HYPERCHOLESTEROLEMIA: ICD-10-CM

## 2024-12-24 DIAGNOSIS — Z00.00 ENCOUNTER FOR GENERAL ADULT MEDICAL EXAMINATION W/OUT ABNORMAL FINDINGS: ICD-10-CM

## 2024-12-24 DIAGNOSIS — R07.9 CHEST PAIN, UNSPECIFIED: ICD-10-CM

## 2024-12-24 DIAGNOSIS — R01.1 CARDIAC MURMUR, UNSPECIFIED: ICD-10-CM

## 2024-12-24 PROCEDURE — G2211 COMPLEX E/M VISIT ADD ON: CPT | Mod: NC

## 2024-12-24 PROCEDURE — 99401 PREV MED CNSL INDIV APPRX 15: CPT

## 2024-12-24 PROCEDURE — 99204 OFFICE O/P NEW MOD 45 MIN: CPT | Mod: 25

## 2024-12-24 RX ORDER — EVOLOCUMAB 140 MG/ML
140 INJECTION, SOLUTION SUBCUTANEOUS
Qty: 1 | Refills: 5 | Status: ACTIVE | COMMUNITY
Start: 2024-12-24 | End: 1900-01-01

## 2025-05-20 ENCOUNTER — APPOINTMENT (OUTPATIENT)
Dept: CARDIOLOGY | Facility: CLINIC | Age: 40
End: 2025-05-20
Payer: COMMERCIAL

## 2025-05-20 ENCOUNTER — NON-APPOINTMENT (OUTPATIENT)
Age: 40
End: 2025-05-20

## 2025-05-20 VITALS
OXYGEN SATURATION: 95 % | HEART RATE: 84 BPM | BODY MASS INDEX: 25.11 KG/M2 | WEIGHT: 175 LBS | SYSTOLIC BLOOD PRESSURE: 130 MMHG | DIASTOLIC BLOOD PRESSURE: 78 MMHG

## 2025-05-20 DIAGNOSIS — R07.9 CHEST PAIN, UNSPECIFIED: ICD-10-CM

## 2025-05-20 PROCEDURE — G0537: CPT

## 2025-05-20 PROCEDURE — 93000 ELECTROCARDIOGRAM COMPLETE: CPT

## 2025-05-20 PROCEDURE — 99214 OFFICE O/P EST MOD 30 MIN: CPT | Mod: 25

## 2025-05-20 PROCEDURE — 99402 PREV MED CNSL INDIV APPRX 30: CPT

## 2025-05-20 PROCEDURE — 93306 TTE W/DOPPLER COMPLETE: CPT

## 2025-05-23 ENCOUNTER — APPOINTMENT (OUTPATIENT)
Dept: INTERNAL MEDICINE | Facility: CLINIC | Age: 40
End: 2025-05-23

## 2025-05-23 ENCOUNTER — LABORATORY RESULT (OUTPATIENT)
Age: 40
End: 2025-05-23

## 2025-05-23 VITALS
HEART RATE: 77 BPM | DIASTOLIC BLOOD PRESSURE: 70 MMHG | HEIGHT: 70 IN | TEMPERATURE: 98.4 F | WEIGHT: 193 LBS | BODY MASS INDEX: 27.63 KG/M2 | OXYGEN SATURATION: 96 % | SYSTOLIC BLOOD PRESSURE: 128 MMHG

## 2025-05-23 DIAGNOSIS — R53.83 OTHER FATIGUE: ICD-10-CM

## 2025-05-23 DIAGNOSIS — E78.5 HYPERLIPIDEMIA, UNSPECIFIED: ICD-10-CM

## 2025-05-23 DIAGNOSIS — Z13.228 ENCOUNTER FOR SCREENING FOR OTHER METABOLIC DISORDERS: ICD-10-CM

## 2025-05-23 DIAGNOSIS — E78.01 FAMILIAL HYPERCHOLESTEROLEMIA: ICD-10-CM

## 2025-05-23 DIAGNOSIS — Z11.3 ENCOUNTER FOR SCREENING FOR INFECTIONS WITH A PREDOMINANTLY SEXUAL MODE OF TRANSMISSION: ICD-10-CM

## 2025-05-23 PROCEDURE — G2211 COMPLEX E/M VISIT ADD ON: CPT

## 2025-05-23 PROCEDURE — 99214 OFFICE O/P EST MOD 30 MIN: CPT

## 2025-05-26 LAB
APPEARANCE: CLEAR
BILIRUBIN URINE: NEGATIVE
BLOOD URINE: NEGATIVE
C TRACH RRNA SPEC QL NAA+PROBE: NOT DETECTED
COLOR: YELLOW
ESTRADIOL SERPL-MCNC: 67 PG/ML
GLUCOSE QUALITATIVE U: NEGATIVE MG/DL
HIV1+2 AB SPEC QL IA.RAPID: NONREACTIVE
KETONES URINE: NEGATIVE MG/DL
LEUKOCYTE ESTERASE URINE: NEGATIVE
N GONORRHOEA RRNA SPEC QL NAA+PROBE: NOT DETECTED
NITRITE URINE: NEGATIVE
PH URINE: 7
PROLACTIN SERPL-MCNC: 8.6 NG/ML
PROTEIN URINE: NEGATIVE MG/DL
SHBG SERPL-SCNC: 8.2 NMOL/L
SOURCE AMPLIFICATION: NORMAL
SOURCE AMPLIFICATION: NORMAL
SPECIFIC GRAVITY URINE: 1.01
T PALLIDUM AB SER QL IA: NEGATIVE
T3RU NFR SERPL: 0.9 TBI
UROBILINOGEN URINE: 0.2 MG/DL

## 2025-05-27 LAB
ESTROGEN SERPL-MCNC: 238 PG/ML
TESTOST FREE SERPL-MCNC: 51.4 PG/ML
TESTOST SERPL-MCNC: >1500 NG/DL

## 2025-05-30 LAB
ANDROSTANOLONE SERPL-MCNC: 100 NG/DL
DHEA-SULFATE, SERUM: 178 UG/DL

## 2025-09-14 PROBLEM — S83.104A ACUTE TRAUMATIC INTERNAL DERANGEMENT OF RIGHT KNEE, INITIAL ENCOUNTER: Status: ACTIVE | Noted: 2025-09-14

## 2025-09-15 ENCOUNTER — APPOINTMENT (OUTPATIENT)
Dept: MRI IMAGING | Facility: CLINIC | Age: 40
End: 2025-09-15
Payer: COMMERCIAL

## 2025-09-15 PROCEDURE — 73721 MRI JNT OF LWR EXTRE W/O DYE: CPT | Mod: RT

## 2025-09-16 ENCOUNTER — APPOINTMENT (OUTPATIENT)
Dept: ORTHOPEDIC SURGERY | Facility: CLINIC | Age: 40
End: 2025-09-16
Payer: COMMERCIAL

## 2025-09-16 DIAGNOSIS — S83.511A SPRAIN OF ANTERIOR CRUCIATE LIGAMENT OF RIGHT KNEE, INITIAL ENCOUNTER: ICD-10-CM

## 2025-09-16 DIAGNOSIS — S83.251A BUCKET-HANDLE TEAR OF LATERAL MENISCUS, CURRENT INJURY, RIGHT KNEE, INITIAL ENCOUNTER: ICD-10-CM

## 2025-09-16 PROCEDURE — 99204 OFFICE O/P NEW MOD 45 MIN: CPT | Mod: 57
